# Patient Record
Sex: FEMALE | Race: WHITE | NOT HISPANIC OR LATINO | Employment: OTHER | URBAN - METROPOLITAN AREA
[De-identification: names, ages, dates, MRNs, and addresses within clinical notes are randomized per-mention and may not be internally consistent; named-entity substitution may affect disease eponyms.]

---

## 2023-05-05 LAB — CANCER AG 125 (U/ML) IN SERUM: 11.4 U/ML (ref 0–30.2)

## 2023-12-01 ENCOUNTER — OFFICE VISIT (OUTPATIENT)
Dept: PRIMARY CARE | Facility: CLINIC | Age: 68
End: 2023-12-01
Payer: MEDICARE

## 2023-12-01 VITALS
DIASTOLIC BLOOD PRESSURE: 80 MMHG | BODY MASS INDEX: 28.25 KG/M2 | WEIGHT: 180 LBS | SYSTOLIC BLOOD PRESSURE: 130 MMHG | RESPIRATION RATE: 16 BRPM | HEIGHT: 67 IN | HEART RATE: 72 BPM

## 2023-12-01 DIAGNOSIS — F17.210 CIGARETTE NICOTINE DEPENDENCE WITHOUT COMPLICATION: ICD-10-CM

## 2023-12-01 DIAGNOSIS — M62.81 MUSCLE WEAKNESS (GENERALIZED): ICD-10-CM

## 2023-12-01 DIAGNOSIS — Z23 FLU VACCINE NEED: ICD-10-CM

## 2023-12-01 DIAGNOSIS — M50.90 CERVICAL DISC DISEASE: Primary | ICD-10-CM

## 2023-12-01 DIAGNOSIS — G47.33 OBSTRUCTIVE SLEEP APNEA: ICD-10-CM

## 2023-12-01 DIAGNOSIS — K21.9 GASTROESOPHAGEAL REFLUX DISEASE WITHOUT ESOPHAGITIS: ICD-10-CM

## 2023-12-01 DIAGNOSIS — K42.9 UMBILICAL HERNIA WITHOUT OBSTRUCTION AND WITHOUT GANGRENE: ICD-10-CM

## 2023-12-01 DIAGNOSIS — K80.20 ASYMPTOMATIC CHOLELITHIASIS: ICD-10-CM

## 2023-12-01 DIAGNOSIS — M51.16 LUMBAR DISC DISEASE WITH RADICULOPATHY: ICD-10-CM

## 2023-12-01 PROBLEM — M54.16 LUMBAR RADICULOPATHY: Status: ACTIVE | Noted: 2023-12-01

## 2023-12-01 PROBLEM — M25.562 LEFT KNEE PAIN: Status: ACTIVE | Noted: 2023-12-01

## 2023-12-01 PROBLEM — R22.1 NECK MASS: Status: ACTIVE | Noted: 2023-12-01

## 2023-12-01 PROBLEM — R05.3 CHRONIC COUGH: Status: ACTIVE | Noted: 2023-12-01

## 2023-12-01 PROBLEM — M54.12 CERVICAL RADICULOPATHY: Status: ACTIVE | Noted: 2023-12-01

## 2023-12-01 PROBLEM — K76.89 BENIGN LIVER CYST: Status: ACTIVE | Noted: 2023-12-01

## 2023-12-01 PROBLEM — J20.8 ACUTE BRONCHITIS DUE TO OTHER SPECIFIED ORGANISMS: Status: ACTIVE | Noted: 2023-12-01

## 2023-12-01 PROBLEM — B27.90 EBV INFECTION: Status: ACTIVE | Noted: 2023-12-01

## 2023-12-01 PROBLEM — R59.9 ADENOPATHY: Status: ACTIVE | Noted: 2023-12-01

## 2023-12-01 PROBLEM — M54.16 LUMBAR BACK PAIN WITH RADICULOPATHY AFFECTING RIGHT LOWER EXTREMITY: Status: ACTIVE | Noted: 2023-12-01

## 2023-12-01 PROBLEM — R06.09 DOE (DYSPNEA ON EXERTION): Status: ACTIVE | Noted: 2023-12-01

## 2023-12-01 PROBLEM — J01.00 ACUTE NON-RECURRENT MAXILLARY SINUSITIS: Status: ACTIVE | Noted: 2023-12-01

## 2023-12-01 PROBLEM — M17.11 RIGHT KNEE DJD: Status: ACTIVE | Noted: 2023-12-01

## 2023-12-01 PROBLEM — M25.572 ACUTE LEFT ANKLE PAIN: Status: ACTIVE | Noted: 2023-12-01

## 2023-12-01 PROBLEM — R91.8 LUNG NODULES: Status: ACTIVE | Noted: 2023-12-01

## 2023-12-01 PROBLEM — S93.432A SPRAIN OF TIBIOFIBULAR LIGAMENT OF LEFT ANKLE: Status: ACTIVE | Noted: 2023-12-01

## 2023-12-01 PROBLEM — R60.0 EDEMA OF LEFT FOOT: Status: ACTIVE | Noted: 2023-12-01

## 2023-12-01 PROBLEM — M25.461 EFFUSION OF RIGHT KNEE: Status: ACTIVE | Noted: 2023-12-01

## 2023-12-01 PROBLEM — J45.991 COUGH VARIANT ASTHMA (HHS-HCC): Status: ACTIVE | Noted: 2023-12-01

## 2023-12-01 PROBLEM — R52 PAIN: Status: ACTIVE | Noted: 2023-12-01

## 2023-12-01 PROBLEM — M25.472 LEFT ANKLE SWELLING: Status: ACTIVE | Noted: 2023-12-01

## 2023-12-01 PROBLEM — I70.90 ATHEROSCLEROSIS: Status: ACTIVE | Noted: 2023-12-01

## 2023-12-01 PROBLEM — M51.369 LUMBAR DEGENERATIVE DISC DISEASE: Status: ACTIVE | Noted: 2023-12-01

## 2023-12-01 PROBLEM — F17.200 NICOTINE DEPENDENCE: Status: ACTIVE | Noted: 2023-12-01

## 2023-12-01 PROBLEM — M51.36 LUMBAR DEGENERATIVE DISC DISEASE: Status: ACTIVE | Noted: 2023-12-01

## 2023-12-01 PROBLEM — M17.11 ARTHRITIS OF RIGHT KNEE: Status: ACTIVE | Noted: 2023-12-01

## 2023-12-01 PROBLEM — B27.00 EBV (EPSTEIN-BARR VIRUS) VIREMIA: Status: ACTIVE | Noted: 2023-12-01

## 2023-12-01 PROBLEM — R59.0 CERVICAL ADENOPATHY: Status: ACTIVE | Noted: 2023-12-01

## 2023-12-01 PROCEDURE — G0008 ADMIN INFLUENZA VIRUS VAC: HCPCS | Performed by: INTERNAL MEDICINE

## 2023-12-01 PROCEDURE — 99214 OFFICE O/P EST MOD 30 MIN: CPT | Performed by: INTERNAL MEDICINE

## 2023-12-01 PROCEDURE — 90662 IIV NO PRSV INCREASED AG IM: CPT | Performed by: INTERNAL MEDICINE

## 2023-12-01 PROCEDURE — 1126F AMNT PAIN NOTED NONE PRSNT: CPT | Performed by: INTERNAL MEDICINE

## 2023-12-01 PROCEDURE — 4004F PT TOBACCO SCREEN RCVD TLK: CPT | Performed by: INTERNAL MEDICINE

## 2023-12-01 ASSESSMENT — ENCOUNTER SYMPTOMS
ENDOCRINE NEGATIVE: 1
GASTROINTESTINAL NEGATIVE: 1
NEUROLOGICAL NEGATIVE: 1
HEMATOLOGIC/LYMPHATIC NEGATIVE: 1
ALLERGIC/IMMUNOLOGIC NEGATIVE: 1
RESPIRATORY NEGATIVE: 1
MUSCULOSKELETAL NEGATIVE: 1
PSYCHIATRIC NEGATIVE: 1
CONSTITUTIONAL NEGATIVE: 1
CARDIOVASCULAR NEGATIVE: 1
EYES NEGATIVE: 1

## 2023-12-01 NOTE — ASSESSMENT & PLAN NOTE
Asymptomatic  now and we will follow closely and consider and advise cholecystectomy and educate risks of gall bladder disease and calculus

## 2023-12-01 NOTE — PROGRESS NOTES
"Subjective   Patient ID: Hanna Gee is a 68 y.o. female who presents for Follow-up (Follow up).    HPI     Review of Systems   Constitutional: Negative.    HENT: Negative.     Eyes: Negative.    Respiratory: Negative.     Cardiovascular: Negative.    Gastrointestinal: Negative.    Endocrine: Negative.    Musculoskeletal: Negative.    Skin: Negative.    Allergic/Immunologic: Negative.    Neurological: Negative.    Hematological: Negative.    Psychiatric/Behavioral: Negative.     All other systems reviewed and are negative.      Objective   Ht 1.702 m (5' 7\")   Wt 81.6 kg (180 lb)   BMI 28.19 kg/m²   Blood pressure 130/80, pulse 72, resp. rate 16, height 1.702 m (5' 7\"), weight 81.6 kg (180 lb).   Physical Exam  Vitals and nursing note reviewed.   Constitutional:       Appearance: Normal appearance.   HENT:      Head: Normocephalic and atraumatic.      Right Ear: Tympanic membrane, ear canal and external ear normal.      Left Ear: Tympanic membrane, ear canal and external ear normal. There is no impacted cerumen.      Nose: Nose normal.      Mouth/Throat:      Mouth: Mucous membranes are moist.      Pharynx: Oropharynx is clear.   Eyes:      Extraocular Movements: Extraocular movements intact.      Conjunctiva/sclera: Conjunctivae normal.      Pupils: Pupils are equal, round, and reactive to light.   Cardiovascular:      Rate and Rhythm: Normal rate and regular rhythm.      Pulses: Normal pulses.      Heart sounds: Normal heart sounds. No murmur heard.  Pulmonary:      Effort: Pulmonary effort is normal. No respiratory distress.      Breath sounds: Normal breath sounds. No stridor. No wheezing, rhonchi or rales.   Chest:      Chest wall: No tenderness.   Abdominal:      General: Abdomen is flat. Bowel sounds are normal. There is no distension.      Palpations: Abdomen is soft. There is no mass.      Tenderness: There is no abdominal tenderness. There is no right CVA tenderness, left CVA tenderness, guarding or " rebound.      Hernia: No hernia is present.   Musculoskeletal:         General: Normal range of motion.      Cervical back: Normal range of motion and neck supple.   Skin:     General: Skin is warm.      Capillary Refill: Capillary refill takes less than 2 seconds.   Neurological:      General: No focal deficit present.      Mental Status: She is alert.      Cranial Nerves: No cranial nerve deficit.      Sensory: No sensory deficit.      Motor: No weakness.      Coordination: Coordination normal.      Gait: Gait normal.      Deep Tendon Reflexes: Reflexes normal.   Psychiatric:         Mood and Affect: Mood normal.         Behavior: Behavior normal. Behavior is cooperative.         Thought Content: Thought content normal.         Judgment: Judgment normal.         Assessment/Plan   Problem List Items Addressed This Visit             ICD-10-CM    Acid reflux K21.9     GERD - Acid reflux disease. Rx. PPI (Prilosec/Prevacid/Protonix/Nexium) and educate diet and life style changes. Referred patient to an endoscopy (EGD) and check H. Pylori titers.          Asymptomatic cholelithiasis K80.20     Asymptomatic  now and we will follow closely and consider and advise cholecystectomy and educate risks of gall bladder disease and calculus          Cervical disc disease - Primary M50.90     DDD - Degenerative disc disease of the )/Cervical (C)  spine. Educate exercises and referred patient to Physical Therapy (PT). Ordered X-Ray's of the /C spine. Consider MRI. Radiculopathy in the distribution of C4-C5-C6 nerve roots, needs NSAIDS (Naprosin 500mg BID vs. Arthrotec 75mg BID or Prednisone taper (Medrol dose pack), Flexeril 10 mg qHS, heat application, and pain control with Tylenol 325 mg TID.           Lumbar disc disease with radiculopathy M51.16     DDD - Degenerative disc disease of the Lumbo-Sacral (LS)/Cervical (C)  spine. Educate exercises and referred patient to Physical Therapy (PT). Ordered X-Ray's of the LS/C spine.  Consider MRI. Radiculopathy in the distribution of L4-L5-S1/C3-C4-C5 nerve roots, needs NSAIDS (Naprosin 500mg BID vs. Arthrotec 75mg BID or Prednisone taper (Medrol dose pack), Flexeril 10 mg qHS, heat application, and pain control with Tylenol 325 mg TID.            Muscle weakness (generalized) M62.81     Kne pains eand .reccomend exercises and strengthening exercises and .Xrays and refer to physical therapy and pain control           Nicotine dependence F17.200     Tobacco cessation - Educate risks of smoking (CAD/COPD/CANCER of the lung or urinary bladder/CVA). Educate life style changes and prescribe nicotine patch and Wellbutrin 150mg BID. Educate stress reduction.                                                                                      Obstructive sleep apnea G47.33     Sleep apnea/nocturnal hypoxia - Not/ using the CPAP consider repeat sleep studies and order a new CPAP - The patient is complaining of day-time sleepiness(falling asleep easily/  narcolepsy) while driving or sitting still. Also patient complains of major tiredness/ fatigue, multiple episodes of night time awakenings(unexpalined). Also patient is at high risk for sleep apnea: overweight, small throat opening and enlarged (kissing) tonsils, sedentary lifestyle, sleeping aides. Recommend: Sleep studies: Nocturnal oxymetry, sleep lab. Consider CPAP vs. Oxygen per Nasal Canula at night at 2L/min. for nocturnal hypoxia          Umbilical hernia without obstruction and without gangrene K42.9     Asymptomatic  now  - educate to avoid strain and recommend repair           Other Visit Diagnoses         Codes    Flu vaccine need     Z23    Relevant Orders    Flu vaccine, quadrivalent, high-dose, preservative free, age 65y+ (FLUZONE)

## 2023-12-01 NOTE — ASSESSMENT & PLAN NOTE
Kne pains eand .reccomend exercises and strengthening exercises and .Xrays and refer to physical therapy and pain control

## 2023-12-01 NOTE — ASSESSMENT & PLAN NOTE
DDD - Degenerative disc disease of the Lumbo-Sacral (LS)/Cervical (C)  spine. Educate exercises and referred patient to Physical Therapy (PT). Ordered X-Ray's of the LS/C spine. Consider MRI. Radiculopathy in the distribution of L4-L5-S1/C3-C4-C5 nerve roots, needs NSAIDS (Naprosin 500mg BID vs. Arthrotec 75mg BID or Prednisone taper (Medrol dose pack), Flexeril 10 mg qHS, heat application, and pain control with Tylenol 325 mg TID.

## 2023-12-01 NOTE — ASSESSMENT & PLAN NOTE
Sleep apnea/nocturnal hypoxia - Not/ using the CPAP consider repeat sleep studies and order a new CPAP - The patient is complaining of day-time sleepiness(falling asleep easily/  narcolepsy) while driving or sitting still. Also patient complains of major tiredness/ fatigue, multiple episodes of night time awakenings(unexpalined). Also patient is at high risk for sleep apnea: overweight, small throat opening and enlarged (kissing) tonsils, sedentary lifestyle, sleeping aides. Recommend: Sleep studies: Nocturnal oxymetry, sleep lab. Consider CPAP vs. Oxygen per Nasal Canula at night at 2L/min. for nocturnal hypoxia

## 2024-02-23 ENCOUNTER — OFFICE VISIT (OUTPATIENT)
Dept: PRIMARY CARE | Facility: CLINIC | Age: 69
End: 2024-02-23
Payer: MEDICARE

## 2024-02-23 ENCOUNTER — LAB (OUTPATIENT)
Dept: LAB | Facility: LAB | Age: 69
End: 2024-02-23
Payer: MEDICARE

## 2024-02-23 VITALS
SYSTOLIC BLOOD PRESSURE: 130 MMHG | BODY MASS INDEX: 28.56 KG/M2 | WEIGHT: 182 LBS | HEART RATE: 72 BPM | HEIGHT: 67 IN | RESPIRATION RATE: 16 BRPM | DIASTOLIC BLOOD PRESSURE: 80 MMHG

## 2024-02-23 DIAGNOSIS — Z13.6 SCREENING FOR CARDIOVASCULAR CONDITION: ICD-10-CM

## 2024-02-23 DIAGNOSIS — E78.00 HYPERCHOLESTEROLEMIA: ICD-10-CM

## 2024-02-23 DIAGNOSIS — R53.83 OTHER FATIGUE: ICD-10-CM

## 2024-02-23 DIAGNOSIS — Z00.00 MEDICARE ANNUAL WELLNESS VISIT, SUBSEQUENT: Primary | ICD-10-CM

## 2024-02-23 DIAGNOSIS — M25.562 ACUTE PAIN OF LEFT KNEE: ICD-10-CM

## 2024-02-23 DIAGNOSIS — Z00.00 ROUTINE GENERAL MEDICAL EXAMINATION AT HEALTH CARE FACILITY: ICD-10-CM

## 2024-02-23 DIAGNOSIS — K21.9 GASTROESOPHAGEAL REFLUX DISEASE WITHOUT ESOPHAGITIS: ICD-10-CM

## 2024-02-23 DIAGNOSIS — Z12.31 SCREENING MAMMOGRAM FOR BREAST CANCER: ICD-10-CM

## 2024-02-23 DIAGNOSIS — M51.16 LUMBAR DISC DISEASE WITH RADICULOPATHY: ICD-10-CM

## 2024-02-23 LAB
ALBUMIN SERPL BCP-MCNC: 4.5 G/DL (ref 3.4–5)
ALP SERPL-CCNC: 55 U/L (ref 33–136)
ALT SERPL W P-5'-P-CCNC: 16 U/L (ref 7–45)
ANION GAP SERPL CALC-SCNC: 12 MMOL/L (ref 10–20)
AST SERPL W P-5'-P-CCNC: 14 U/L (ref 9–39)
BASOPHILS # BLD AUTO: 0.04 X10*3/UL (ref 0–0.1)
BASOPHILS NFR BLD AUTO: 0.6 %
BILIRUB SERPL-MCNC: 0.7 MG/DL (ref 0–1.2)
BUN SERPL-MCNC: 17 MG/DL (ref 6–23)
CALCIUM SERPL-MCNC: 9.7 MG/DL (ref 8.6–10.6)
CHLORIDE SERPL-SCNC: 105 MMOL/L (ref 98–107)
CHOLEST SERPL-MCNC: 195 MG/DL (ref 0–199)
CHOLESTEROL/HDL RATIO: 2.3
CO2 SERPL-SCNC: 28 MMOL/L (ref 21–32)
CREAT SERPL-MCNC: 0.87 MG/DL (ref 0.5–1.05)
EGFRCR SERPLBLD CKD-EPI 2021: 74 ML/MIN/1.73M*2
EOSINOPHIL # BLD AUTO: 0.04 X10*3/UL (ref 0–0.7)
EOSINOPHIL NFR BLD AUTO: 0.6 %
ERYTHROCYTE [DISTWIDTH] IN BLOOD BY AUTOMATED COUNT: 14.5 % (ref 11.5–14.5)
GLUCOSE SERPL-MCNC: 94 MG/DL (ref 74–99)
HCT VFR BLD AUTO: 40.7 % (ref 36–46)
HDLC SERPL-MCNC: 83.7 MG/DL
HGB BLD-MCNC: 14 G/DL (ref 12–16)
IMM GRANULOCYTES # BLD AUTO: 0.01 X10*3/UL (ref 0–0.7)
IMM GRANULOCYTES NFR BLD AUTO: 0.1 % (ref 0–0.9)
LDLC SERPL CALC-MCNC: 95 MG/DL
LYMPHOCYTES # BLD AUTO: 1.71 X10*3/UL (ref 1.2–4.8)
LYMPHOCYTES NFR BLD AUTO: 25.3 %
MCH RBC QN AUTO: 30 PG (ref 26–34)
MCHC RBC AUTO-ENTMCNC: 34.4 G/DL (ref 32–36)
MCV RBC AUTO: 87 FL (ref 80–100)
MONOCYTES # BLD AUTO: 0.42 X10*3/UL (ref 0.1–1)
MONOCYTES NFR BLD AUTO: 6.2 %
NEUTROPHILS # BLD AUTO: 4.55 X10*3/UL (ref 1.2–7.7)
NEUTROPHILS NFR BLD AUTO: 67.2 %
NON HDL CHOLESTEROL: 111 MG/DL (ref 0–149)
NRBC BLD-RTO: 0 /100 WBCS (ref 0–0)
PLATELET # BLD AUTO: 262 X10*3/UL (ref 150–450)
POTASSIUM SERPL-SCNC: 4.4 MMOL/L (ref 3.5–5.3)
PROT SERPL-MCNC: 6.9 G/DL (ref 6.4–8.2)
RBC # BLD AUTO: 4.66 X10*6/UL (ref 4–5.2)
SODIUM SERPL-SCNC: 141 MMOL/L (ref 136–145)
TRIGL SERPL-MCNC: 83 MG/DL (ref 0–149)
TSH SERPL-ACNC: 1.59 MIU/L (ref 0.44–3.98)
VLDL: 17 MG/DL (ref 0–40)
WBC # BLD AUTO: 6.8 X10*3/UL (ref 4.4–11.3)

## 2024-02-23 PROCEDURE — 93000 ELECTROCARDIOGRAM COMPLETE: CPT | Performed by: INTERNAL MEDICINE

## 2024-02-23 PROCEDURE — 84443 ASSAY THYROID STIM HORMONE: CPT

## 2024-02-23 PROCEDURE — 80053 COMPREHEN METABOLIC PANEL: CPT

## 2024-02-23 PROCEDURE — 99214 OFFICE O/P EST MOD 30 MIN: CPT | Performed by: INTERNAL MEDICINE

## 2024-02-23 PROCEDURE — 85025 COMPLETE CBC W/AUTO DIFF WBC: CPT

## 2024-02-23 PROCEDURE — 1170F FXNL STATUS ASSESSED: CPT | Performed by: INTERNAL MEDICINE

## 2024-02-23 PROCEDURE — G0439 PPPS, SUBSEQ VISIT: HCPCS | Performed by: INTERNAL MEDICINE

## 2024-02-23 PROCEDURE — 1159F MED LIST DOCD IN RCRD: CPT | Performed by: INTERNAL MEDICINE

## 2024-02-23 PROCEDURE — 80061 LIPID PANEL: CPT

## 2024-02-23 PROCEDURE — 1126F AMNT PAIN NOTED NONE PRSNT: CPT | Performed by: INTERNAL MEDICINE

## 2024-02-23 PROCEDURE — 36415 COLL VENOUS BLD VENIPUNCTURE: CPT

## 2024-02-23 PROCEDURE — 1160F RVW MEDS BY RX/DR IN RCRD: CPT | Performed by: INTERNAL MEDICINE

## 2024-02-23 ASSESSMENT — ACTIVITIES OF DAILY LIVING (ADL)
BATHING: INDEPENDENT
GROCERY_SHOPPING: INDEPENDENT
TAKING_MEDICATION: INDEPENDENT
DOING_HOUSEWORK: INDEPENDENT
DRESSING: INDEPENDENT
MANAGING_FINANCES: INDEPENDENT

## 2024-02-23 ASSESSMENT — ENCOUNTER SYMPTOMS
GASTROINTESTINAL NEGATIVE: 1
ENDOCRINE NEGATIVE: 1
ALLERGIC/IMMUNOLOGIC NEGATIVE: 1
CARDIOVASCULAR NEGATIVE: 1
RESPIRATORY NEGATIVE: 1
NEUROLOGICAL NEGATIVE: 1
EYES NEGATIVE: 1
HEMATOLOGIC/LYMPHATIC NEGATIVE: 1
ARTHRALGIAS: 1
CONSTITUTIONAL NEGATIVE: 1
PSYCHIATRIC NEGATIVE: 1

## 2024-02-23 ASSESSMENT — PATIENT HEALTH QUESTIONNAIRE - PHQ9
1. LITTLE INTEREST OR PLEASURE IN DOING THINGS: NOT AT ALL
SUM OF ALL RESPONSES TO PHQ9 QUESTIONS 1 AND 2: 0
2. FEELING DOWN, DEPRESSED OR HOPELESS: NOT AT ALL

## 2024-02-23 NOTE — ASSESSMENT & PLAN NOTE
DJD - Degenerative Joint Disease, Chronic Arthritis, of the Left more than the right  Knee. . Pain control, and anti-inflammatory meds : consider Kenalog injection and start Voltaren gel 1% topically BID,  and  Tylenol 325 mg TID PRN. Educate exercises and referred the patient to PT (Physical Therapy). Get X-Ray's.

## 2024-02-23 NOTE — ASSESSMENT & PLAN NOTE
No recent hospitalizations.    All medications reviewed and reconciled by me the provider..  No use of controlled substances or opiates.    Family history, social history reviewed, no changes.    Patient does not smoke.    Patient does not drink.    Patient hydrates adequately daily.  Eats a well-balanced healthy diet.     Exercises adequately including walking and doing weightbearing exercises.    Patient denies any difficulty with memory or cognition.     Denies any difficulty with hearing.  Patient does not wear hearing aids.    No fall risk.  No recent falls.  Denies any difficulty walking.    Patient with no history of depression anxiety, denies any loss of interest, no feeling of sadness, no lack of motivation.    Patient is independent in all ADLs and IADLs.  Independent bathing, dressing, walking.  Takes care of own finances, shopping and cooking.     End-of-life decision-making power of  reviewed with patient.     Risk Factors Identified During Visit: None.   Influenza: influenza vaccine was previously given.   Pneumovax 23: Pneumovax 23 vaccine was previously given.   Prevnar 13: Prevnar 13 vaccine was previously given.   Shingles Vaccine: Shingles vaccine was previously given.   Colorectal Cancer Screening: screening is current.   Abdominal Aortic Aneurysm screening: screening is current.   HIV screening: screening not indicated.      Preventive measures - Recommend ASAP : PAP/Mamogram and refer patient to GYN. Specialist. Colonoscopy (educate patient risks of colon cancer) refer patient to GI specialist. Ophthalmology and retina exam recommend yearly exams refer patient to an Ophthalmologist.

## 2024-02-23 NOTE — PROGRESS NOTES
"Subjective   Patient ID: Hanna Gee is a 68 y.o. female who presents for Welcome To Medicare (Welcome to medicare/Follow up). Left knee pain     HPI     Review of Systems   Constitutional: Negative.    HENT: Negative.     Eyes: Negative.    Respiratory: Negative.     Cardiovascular: Negative.    Gastrointestinal: Negative.    Endocrine: Negative.    Musculoskeletal:  Positive for arthralgias.   Skin: Negative.    Allergic/Immunologic: Negative.    Neurological: Negative.    Hematological: Negative.    Psychiatric/Behavioral: Negative.     All other systems reviewed and are negative.      Objective   Ht 1.702 m (5' 7\")   Wt 82.6 kg (182 lb)   BMI 28.51 kg/m²   Blood pressure 130/80, pulse 72, resp. rate 16, height 1.702 m (5' 7\"), weight 82.6 kg (182 lb).   Physical Exam  Vitals and nursing note reviewed.   Constitutional:       Appearance: Normal appearance.   HENT:      Head: Normocephalic and atraumatic.      Right Ear: Tympanic membrane, ear canal and external ear normal.      Left Ear: Tympanic membrane, ear canal and external ear normal. There is no impacted cerumen.      Nose: Nose normal.      Mouth/Throat:      Mouth: Mucous membranes are moist.      Pharynx: Oropharynx is clear.   Eyes:      Extraocular Movements: Extraocular movements intact.      Conjunctiva/sclera: Conjunctivae normal.      Pupils: Pupils are equal, round, and reactive to light.   Cardiovascular:      Rate and Rhythm: Normal rate and regular rhythm.      Pulses: Normal pulses.      Heart sounds: Normal heart sounds. No murmur heard.  Pulmonary:      Effort: Pulmonary effort is normal. No respiratory distress.      Breath sounds: Normal breath sounds. No stridor. No wheezing, rhonchi or rales.   Chest:      Chest wall: No tenderness.   Abdominal:      General: Abdomen is flat. Bowel sounds are normal. There is no distension.      Palpations: Abdomen is soft. There is no mass.      Tenderness: There is no abdominal tenderness. There is " no right CVA tenderness, left CVA tenderness, guarding or rebound.      Hernia: No hernia is present.   Musculoskeletal:         General: Normal range of motion.      Cervical back: Normal range of motion and neck supple.   Skin:     General: Skin is warm.      Capillary Refill: Capillary refill takes less than 2 seconds.   Neurological:      General: No focal deficit present.      Mental Status: She is alert.      Cranial Nerves: No cranial nerve deficit.      Sensory: No sensory deficit.      Motor: No weakness.      Coordination: Coordination normal.      Gait: Gait normal.      Deep Tendon Reflexes: Reflexes normal.   Psychiatric:         Mood and Affect: Mood normal.         Behavior: Behavior normal. Behavior is cooperative.         Thought Content: Thought content normal.         Judgment: Judgment normal.         Assessment/Plan   Problem List Items Addressed This Visit             ICD-10-CM    Acid reflux K21.9     GERD - Acid reflux disease. Rx. PPI (Prilosec/Prevacid/Protonix/Nexium) and educate diet and life style changes. Referred patient to an endoscopy (EGD) and check H. Pylori titers.          Lumbar disc disease with radiculopathy M51.16     DDD - Degenerative disc disease of the Lumbo-Sacral (LS)/Cervical (C) spine. Educate exercises and referred patient to Physical Therapy (PT). Ordered X-Ray's of the LS/C spine. Consider MRI. Radiculopathy in the distribution of L4-L5-S1/C3-C4-C5 nerve roots, needs NSAIDS (Naprosin 500mg BID vs. Arthrotec 75mg BID or Prednisone taper (Medrol dose pack), Flexeril 10 mg qHS, heat application, and pain control with Tylenol 325 mg TID.          Left knee pain M25.562     DJD - Degenerative Joint Disease, Chronic Arthritis, of the Left more than the right  Knee. . Pain control, and anti-inflammatory meds : consider Kenalog injection and start Voltaren gel 1% topically BID,  and  Tylenol 325 mg TID PRN. Educate exercises and referred the patient to PT (Physical  Therapy). Get X-Ray's.             Relevant Orders    XR knee left 1-2 views    Medicare annual wellness visit, subsequent - Primary Z00.00     No recent hospitalizations.    All medications reviewed and reconciled by me the provider..  No use of controlled substances or opiates.    Family history, social history reviewed, no changes.    Patient does not smoke.    Patient does not drink.    Patient hydrates adequately daily.  Eats a well-balanced healthy diet.     Exercises adequately including walking and doing weightbearing exercises.    Patient denies any difficulty with memory or cognition.     Denies any difficulty with hearing.  Patient does not wear hearing aids.    No fall risk.  No recent falls.  Denies any difficulty walking.    Patient with no history of depression anxiety, denies any loss of interest, no feeling of sadness, no lack of motivation.    Patient is independent in all ADLs and IADLs.  Independent bathing, dressing, walking.  Takes care of own finances, shopping and cooking.     End-of-life decision-making power of  reviewed with patient.     Risk Factors Identified During Visit: None.   Influenza: influenza vaccine was previously given.   Pneumovax 23: Pneumovax 23 vaccine was previously given.   Prevnar 13: Prevnar 13 vaccine was previously given.   Shingles Vaccine: Shingles vaccine was previously given.   Colorectal Cancer Screening: screening is current.   Abdominal Aortic Aneurysm screening: screening is current.   HIV screening: screening not indicated.      Preventive measures - Recommend ASAP : PAP/Mamogram and refer patient to GYN. Specialist. Colonoscopy (educate patient risks of colon cancer) refer patient to GI specialist. Ophthalmology and retina exam recommend yearly exams refer patient to an Ophthalmologist.           Other Visit Diagnoses         Codes    Screening mammogram for breast cancer     Z12.31    Relevant Orders    BI mammo bilateral screening tomosynthesis             Acid reflux K21.9        GERD - Acid reflux disease. Rx. PPI (Prilosec/Prevacid/Protonix/Nexium) and educate diet and life style changes. Referred patient to an endoscopy (EGD) and check H. Pylori titers.            Asymptomatic cholelithiasis K80.20       Asymptomatic  now and we will follow closely and consider and advise cholecystectomy and educate risks of gall bladder disease and calculus            Cervical disc disease - Primary M50.90       DDD - Degenerative disc disease of the )/Cervical (C)  spine. Educate exercises and referred patient to Physical Therapy (PT). Ordered X-Ray's of the /C spine. Consider MRI. Radiculopathy in the distribution of C4-C5-C6 nerve roots, needs NSAIDS (Naprosin 500mg BID vs. Arthrotec 75mg BID or Prednisone taper (Medrol dose pack), Flexeril 10 mg qHS, heat application, and pain control with Tylenol 325 mg TID.             Lumbar disc disease with radiculopathy M51.16       DDD - Degenerative disc disease of the Lumbo-Sacral (LS)/Cervical (C)  spine. Educate exercises and referred patient to Physical Therapy (PT). Ordered X-Ray's of the LS/C spine. Consider MRI. Radiculopathy in the distribution of L4-L5-S1/C3-C4-C5 nerve roots, needs NSAIDS (Naprosin 500mg BID vs. Arthrotec 75mg BID or Prednisone taper (Medrol dose pack), Flexeril 10 mg qHS, heat application, and pain control with Tylenol 325 mg TID.               Muscle weakness (generalized) M62.81       Kne pains eand .reccomend exercises and strengthening exercises and .Xrays and refer to physical therapy and pain control             Nicotine dependence F17.200       Tobacco cessation - Educate risks of smoking (CAD/COPD/CANCER of the lung or urinary bladder/CVA). Educate life style changes and prescribe nicotine patch and Wellbutrin 150mg BID. Educate stress reduction.                                                                                        Obstructive sleep apnea G47.33       Sleep  apnea/nocturnal hypoxia - Not/ using the CPAP consider repeat sleep studies and order a new CPAP - The patient is complaining of day-time sleepiness(falling asleep easily/  narcolepsy) while driving or sitting still. Also patient complains of major tiredness/ fatigue, multiple episodes of night time awakenings(unexpalined). Also patient is at high risk for sleep apnea: overweight, small throat opening and enlarged (kissing) tonsils, sedentary lifestyle, sleeping aides. Recommend: Sleep studies: Nocturnal oxymetry, sleep lab. Consider CPAP vs. Oxygen per Nasal Canula at night at 2L/min. for nocturnal hypoxia            Umbilical hernia without obstruction and without gangrene K42.9       Asymptomatic  now  - educate to avoid strain and recommend repair             Other Visit Diagnoses           Codes     Flu vaccine need     Z23     Relevant Orders     Flu vaccine, quadrivalent, high-dose, preservative free, age 65y+ (FLUZONE)                            Immunizations/Injections      very important  Immunizations from outside sources need reconciliation.      Flu vaccine, quadrivalent, high-dose, preservative free, age 65y+ (FLUZONE)12/1/2023  Pfizer COVID-19 vaccine, Fall 2023, 12 years and older, (30mcg/0.3mL)11/10/2023  Pfizer COVID-19 vaccine, bivalent, age 12 years and older (30 mcg/0.3 mL)10/26/2022  Pfizer Purple Cap SARS-CoV-21/2/2022, 4/4/2021, 3/13/2021

## 2024-04-04 ENCOUNTER — OFFICE VISIT (OUTPATIENT)
Dept: PRIMARY CARE | Facility: CLINIC | Age: 69
End: 2024-04-04
Payer: MEDICARE

## 2024-04-04 VITALS
WEIGHT: 182 LBS | BODY MASS INDEX: 28.56 KG/M2 | HEIGHT: 67 IN | DIASTOLIC BLOOD PRESSURE: 65 MMHG | RESPIRATION RATE: 16 BRPM | HEART RATE: 72 BPM | SYSTOLIC BLOOD PRESSURE: 115 MMHG

## 2024-04-04 DIAGNOSIS — M50.90 CERVICAL DISC DISEASE: ICD-10-CM

## 2024-04-04 DIAGNOSIS — G47.33 OBSTRUCTIVE SLEEP APNEA: ICD-10-CM

## 2024-04-04 DIAGNOSIS — H93.11 TINNITUS OF RIGHT EAR: Primary | ICD-10-CM

## 2024-04-04 DIAGNOSIS — H90.3 SENSORINEURAL HEARING LOSS (SNHL) OF BOTH EARS: ICD-10-CM

## 2024-04-04 DIAGNOSIS — K21.9 GASTROESOPHAGEAL REFLUX DISEASE WITHOUT ESOPHAGITIS: ICD-10-CM

## 2024-04-04 DIAGNOSIS — M51.16 LUMBAR DISC DISEASE WITH RADICULOPATHY: ICD-10-CM

## 2024-04-04 PROBLEM — K57.32 DIVERTICULITIS OF COLON: Status: RESOLVED | Noted: 2024-04-04 | Resolved: 2024-04-04

## 2024-04-04 PROBLEM — S93.439A SPRAIN OF TIBIOFIBULAR LIGAMENT OF ANKLE: Status: RESOLVED | Noted: 2023-12-01 | Resolved: 2024-04-04

## 2024-04-04 PROBLEM — N83.00 FOLLICULAR CYST OF OVARY: Status: RESOLVED | Noted: 2024-04-04 | Resolved: 2024-04-04

## 2024-04-04 PROBLEM — H16.9 KERATITIS: Status: RESOLVED | Noted: 2024-04-04 | Resolved: 2024-04-04

## 2024-04-04 PROBLEM — J45.991 COUGH VARIANT ASTHMA (HHS-HCC): Status: RESOLVED | Noted: 2023-12-01 | Resolved: 2024-04-04

## 2024-04-04 PROBLEM — H61.20 IMPACTED CERUMEN: Status: RESOLVED | Noted: 2024-04-04 | Resolved: 2024-04-04

## 2024-04-04 PROBLEM — R10.31 RIGHT LOWER QUADRANT ABDOMINAL PAIN: Status: RESOLVED | Noted: 2024-04-04 | Resolved: 2024-04-04

## 2024-04-04 PROCEDURE — 1159F MED LIST DOCD IN RCRD: CPT | Performed by: INTERNAL MEDICINE

## 2024-04-04 PROCEDURE — 99214 OFFICE O/P EST MOD 30 MIN: CPT | Performed by: INTERNAL MEDICINE

## 2024-04-04 PROCEDURE — 1160F RVW MEDS BY RX/DR IN RCRD: CPT | Performed by: INTERNAL MEDICINE

## 2024-04-04 RX ORDER — SPIRONOLACTONE 25 MG/1
25 TABLET ORAL DAILY
Qty: 30 TABLET | Refills: 5 | Status: SHIPPED | OUTPATIENT
Start: 2024-04-04 | End: 2024-10-01

## 2024-04-04 RX ORDER — FLUTICASONE FUROATE 27.5 UG/1
1 SPRAY, METERED NASAL
Qty: 10 G | Refills: 5 | Status: SHIPPED | OUTPATIENT
Start: 2024-04-04 | End: 2024-05-24 | Stop reason: ALTCHOICE

## 2024-04-04 RX ORDER — MINERAL OIL
180 ENEMA (ML) RECTAL DAILY
Qty: 30 TABLET | Refills: 5 | Status: SHIPPED | OUTPATIENT
Start: 2024-04-04 | End: 2024-05-24 | Stop reason: ALTCHOICE

## 2024-04-04 ASSESSMENT — ENCOUNTER SYMPTOMS
HEMATOLOGIC/LYMPHATIC NEGATIVE: 1
CONSTITUTIONAL NEGATIVE: 1
GASTROINTESTINAL NEGATIVE: 1
ALLERGIC/IMMUNOLOGIC NEGATIVE: 1
RESPIRATORY NEGATIVE: 1
EYES NEGATIVE: 1
MUSCULOSKELETAL NEGATIVE: 1
CARDIOVASCULAR NEGATIVE: 1
NEUROLOGICAL NEGATIVE: 1
PSYCHIATRIC NEGATIVE: 1
ENDOCRINE NEGATIVE: 1

## 2024-04-04 NOTE — PROGRESS NOTES
"Subjective   Patient ID: Hanna Gee is a 68 y.o. female who presents for Tinnitus (Ringing in right ear for 2-3 weeks ).    HPI     Review of Systems   Constitutional: Negative.    HENT:  Positive for hearing loss and tinnitus.    Eyes: Negative.    Respiratory: Negative.     Cardiovascular: Negative.    Gastrointestinal: Negative.    Endocrine: Negative.    Musculoskeletal: Negative.    Skin: Negative.    Allergic/Immunologic: Negative.    Neurological: Negative.    Hematological: Negative.    Psychiatric/Behavioral: Negative.     All other systems reviewed and are negative.      Objective   Ht 1.702 m (5' 7\")   Wt 82.6 kg (182 lb)   BMI 28.51 kg/m²   Height 1.702 m (5' 7\"), weight 82.6 kg (182 lb).   Physical Exam  Vitals and nursing note reviewed.   Constitutional:       Appearance: Normal appearance.   HENT:      Head: Normocephalic and atraumatic.      Right Ear: Tympanic membrane, ear canal and external ear normal.      Left Ear: Tympanic membrane, ear canal and external ear normal. There is no impacted cerumen.      Nose: Nose normal.      Mouth/Throat:      Mouth: Mucous membranes are moist.      Pharynx: Oropharynx is clear.   Eyes:      Extraocular Movements: Extraocular movements intact.      Conjunctiva/sclera: Conjunctivae normal.      Pupils: Pupils are equal, round, and reactive to light.   Cardiovascular:      Rate and Rhythm: Normal rate and regular rhythm.      Pulses: Normal pulses.      Heart sounds: Normal heart sounds. No murmur heard.  Pulmonary:      Effort: Pulmonary effort is normal. No respiratory distress.      Breath sounds: Normal breath sounds. No stridor. No wheezing, rhonchi or rales.   Chest:      Chest wall: No tenderness.   Abdominal:      General: Abdomen is flat. Bowel sounds are normal. There is no distension.      Palpations: Abdomen is soft. There is no mass.      Tenderness: There is no abdominal tenderness. There is no right CVA tenderness, left CVA tenderness, guarding " or rebound.      Hernia: No hernia is present.   Musculoskeletal:         General: Normal range of motion.      Cervical back: Normal range of motion and neck supple.   Skin:     General: Skin is warm.      Capillary Refill: Capillary refill takes less than 2 seconds.   Neurological:      General: No focal deficit present.      Mental Status: She is alert.      Cranial Nerves: No cranial nerve deficit.      Sensory: No sensory deficit.      Motor: No weakness.      Coordination: Coordination normal.      Gait: Gait normal.      Deep Tendon Reflexes: Reflexes normal.   Psychiatric:         Mood and Affect: Mood normal.         Behavior: Behavior normal. Behavior is cooperative.         Thought Content: Thought content normal.         Judgment: Judgment normal.         Assessment/Plan   Problem List Items Addressed This Visit             ICD-10-CM    Acid reflux K21.9    Cervical disc disease M50.90    Lumbar disc disease with radiculopathy M51.16    Obstructive sleep apnea G47.33    Tinnitus of right ear - Primary H93.11     Refer to ENT and start low dose Spironolactone 25 mg daily and monitor and start Flonase I BID and Allegra 180 mg daily          Relevant Medications    fluticasone (Flonase Sensimist) 27.5 mcg/actuation nasal spray    spironolactone (Aldactone) 25 mg tablet    fexofenadine (Allegra) 180 mg tablet    Other Relevant Orders    Referral to ENT    Sensorineural hearing loss (SNHL) of both ears H90.3     Refer to ENT and audiology monitor cerumen impaction         Relevant Orders    Referral to ENT          Acid reflux K21.9         GERD - Acid reflux disease. Rx. PPI (Prilosec/Prevacid/Protonix/Nexium) and educate diet and life style changes. Referred patient to an endoscopy (EGD) and check H. Pylori titers.            Asymptomatic cholelithiasis K80.20       Asymptomatic  now and we will follow closely and consider and advise cholecystectomy and educate risks of gall bladder disease and calculus             Cervical disc disease - Primary M50.90       DDD - Degenerative disc disease of the )/Cervical (C)  spine. Educate exercises and referred patient to Physical Therapy (PT). Ordered X-Ray's of the /C spine. Consider MRI. Radiculopathy in the distribution of C4-C5-C6 nerve roots, needs NSAIDS (Naprosin 500mg BID vs. Arthrotec 75mg BID or Prednisone taper (Medrol dose pack), Flexeril 10 mg qHS, heat application, and pain control with Tylenol 325 mg TID.             Lumbar disc disease with radiculopathy M51.16       DDD - Degenerative disc disease of the Lumbo-Sacral (LS)/Cervical (C)  spine. Educate exercises and referred patient to Physical Therapy (PT). Ordered X-Ray's of the LS/C spine. Consider MRI. Radiculopathy in the distribution of L4-L5-S1/C3-C4-C5 nerve roots, needs NSAIDS (Naprosin 500mg BID vs. Arthrotec 75mg BID or Prednisone taper (Medrol dose pack), Flexeril 10 mg qHS, heat application, and pain control with Tylenol 325 mg TID.               Muscle weakness (generalized) M62.81       Kne pains eand .reccomend exercises and strengthening exercises and .Xrays and refer to physical therapy and pain control             Nicotine dependence F17.200       Tobacco cessation - Educate risks of smoking (CAD/COPD/CANCER of the lung or urinary bladder/CVA). Educate life style changes and prescribe nicotine patch and Wellbutrin 150mg BID. Educate stress reduction.                                                                                        Obstructive sleep apnea G47.33       Sleep apnea/nocturnal hypoxia - Not/ using the CPAP consider repeat sleep studies and order a new CPAP - The patient is complaining of day-time sleepiness(falling asleep easily/  narcolepsy) while driving or sitting still. Also patient complains of major tiredness/ fatigue, multiple episodes of night time awakenings(unexpalined). Also patient is at high risk for sleep apnea: overweight, small throat opening and enlarged  (kissing) tonsils, sedentary lifestyle, sleeping aides. Recommend: Sleep studies: Nocturnal oxymetry, sleep lab. Consider CPAP vs. Oxygen per Nasal Canula at night at 2L/min. for nocturnal hypoxia            Umbilical hernia without obstruction and without gangrene K42.9       Asymptomatic  now  - educate to avoid strain and recommend repair             Other Visit Diagnoses           Codes     Flu vaccine need     Z23     Relevant Orders     Flu vaccine, quadrivalent, high-dose, preservative free, age 65y+ (FLUZONE)                             Immunizations/Injections       very important  Immunizations from outside sources need reconciliation.       Flu vaccine, quadrivalent, high-dose, preservative free, age 65y+ (FLUZONE)12/1/2023  Pfizer COVID-19 vaccine, Fall 2023, 12 years and older, (30mcg/0.3mL)11/10/2023  Pfizer COVID-19 vaccine, bivalent, age 12 years and older (30 mcg/0.3 mL)10/26/2022  Pfizer Purple Cap SARS-CoV-21/2/2022, 4/4/2021, 3/13/2021                 Immunizations/Injections      very important  Immunizations from outside sources need reconciliation.      Flu vaccine, quadrivalent, high-dose, preservative free, age 65y+ (FLUZONE)12/1/2023  Pfizer COVID-19 vaccine, Fall 2023, 12 years and older, (30mcg/0.3mL)11/10/2023  Pfizer COVID-19 vaccine, bivalent, age 12 years and older (30 mcg/0.3 mL)10/26/2022  Pfizer Purple Cap SARS-CoV-21/2/2022, 4/4/2021, 3/13/2021  Pneumococcal conjugate vaccine, 20-valent (PREVNAR 20)5/5/2023

## 2024-04-04 NOTE — ASSESSMENT & PLAN NOTE
Refer to ENT and start low dose Spironolactone 25 mg daily and monitor and start Flonase I BID and Allegra 180 mg daily

## 2024-04-11 ENCOUNTER — OFFICE VISIT (OUTPATIENT)
Dept: OTOLARYNGOLOGY | Facility: CLINIC | Age: 69
End: 2024-04-11
Payer: MEDICARE

## 2024-04-11 ENCOUNTER — CLINICAL SUPPORT (OUTPATIENT)
Dept: AUDIOLOGY | Facility: CLINIC | Age: 69
End: 2024-04-11
Payer: MEDICARE

## 2024-04-11 VITALS — WEIGHT: 182 LBS | BODY MASS INDEX: 28.56 KG/M2 | HEIGHT: 67 IN

## 2024-04-11 DIAGNOSIS — R79.1 ABNORMAL BLOOD COAGULATION PROFILE: ICD-10-CM

## 2024-04-11 DIAGNOSIS — H93.11 TINNITUS OF RIGHT EAR: ICD-10-CM

## 2024-04-11 DIAGNOSIS — H90.3 SENSORINEURAL HEARING LOSS (SNHL) OF BOTH EARS: ICD-10-CM

## 2024-04-11 DIAGNOSIS — H61.23 BILATERAL IMPACTED CERUMEN: ICD-10-CM

## 2024-04-11 DIAGNOSIS — H93.13 TINNITUS OF BOTH EARS: ICD-10-CM

## 2024-04-11 DIAGNOSIS — H90.A21 SENSORINEURAL HEARING LOSS (SNHL) OF RIGHT EAR WITH RESTRICTED HEARING OF LEFT EAR: Primary | ICD-10-CM

## 2024-04-11 DIAGNOSIS — D37.09 NEOPLASM OF UNCERTAIN BEHAVIOR OF UVULA: ICD-10-CM

## 2024-04-11 DIAGNOSIS — H90.3 ASNHL (ASYMMETRICAL SENSORINEURAL HEARING LOSS): ICD-10-CM

## 2024-04-11 PROCEDURE — 1160F RVW MEDS BY RX/DR IN RCRD: CPT | Performed by: OTOLARYNGOLOGY

## 2024-04-11 PROCEDURE — 92567 TYMPANOMETRY: CPT

## 2024-04-11 PROCEDURE — 92557 COMPREHENSIVE HEARING TEST: CPT

## 2024-04-11 PROCEDURE — 69210 REMOVE IMPACTED EAR WAX UNI: CPT | Performed by: OTOLARYNGOLOGY

## 2024-04-11 PROCEDURE — 1159F MED LIST DOCD IN RCRD: CPT | Performed by: OTOLARYNGOLOGY

## 2024-04-11 PROCEDURE — 99204 OFFICE O/P NEW MOD 45 MIN: CPT | Performed by: OTOLARYNGOLOGY

## 2024-04-11 ASSESSMENT — ENCOUNTER SYMPTOMS
ROS GI COMMENTS: HEARTBURN
SHORTNESS OF BREATH: 1

## 2024-04-11 NOTE — PROGRESS NOTES
Subjective   Patient ID: Hanna Gee is a 68 y.o. female  HPI  Patient is complaining of a chronic history of bilateral nonpulsatile tinnitus.  She has no otalgia and no otorrhea and she has not noticed a sudden change in her hearing.    Review of Systems   HENT:  Positive for hearing loss and tinnitus.         Itchy ears, snoring, mouth lesions   Respiratory:  Positive for shortness of breath.    Gastrointestinal:         Heartburn       Objective   Physical Exam  The following elements of a brief ear nose and throat exam were performed: External ear canals and tympanic membranes, external nose and nasal passages, oral cavity, palpation of the neck, percussion of the face, palpation of the thyroid.    There is cerumen impaction bilaterally and this was cleared using speculum and curettes.  The tympanic membranes are clear and mobile.  There is a 1 cm papillomatous lesion noted on the uvula posteriorly.  The remainder of her exam was within normal limits.  An audiogram and tympanogram were ordered obtained and results were reviewed today and reveal bilateral mild to moderately severe downsloping sensorineural hearing loss with asymmetry on the right side.  The tympanograms are normal.    Ear cerumen removal    Date/Time: 4/11/2024 3:02 PM    Performed by: Missy Castillo MD  Authorized by: Missy Castillo MD    Consent:     Consent obtained:  Verbal    Risks discussed:  Pain  Procedure details:     Location:  L ear and R ear    Procedure type: curette        Assessment/Plan   Diagnoses and all orders for this visit:  Sensorineural hearing loss (SNHL) of right ear with restricted hearing of left ear (Primary)  -     Creatinine; Future  -     Blood Urea Nitrogen; Future  -     MR IAC w and wo IV contrast; Future  ASNHL (asymmetrical sensorineural hearing loss)  -     Creatinine; Future  -     Blood Urea Nitrogen; Future  -     MR IAC w and wo IV contrast; Future  Tinnitus of both ears  Neoplasm of uncertain behavior of  uvula  -     Case Request Operating Room: Excision Tissue Oral Cavity; Standing  -     Request for Pre-Admission Testing Visit; Future  -     Coagulation Screen; Future  -     CBC; Future  -     Basic Metabolic Panel; Future  -     Case Request Operating Room: Excision Tissue Oral Cavity  Bilateral impacted cerumen  -     Ear cerumen removal  Abnormal blood coagulation profile  -     Coagulation Screen; Future  Other orders  -     Place in outpatient/hospital ambulatory surgery; Standing  -     Full code; Standing  -     Vital Signs; Standing  -     Pulse oximetry, spot; Standing     1.  Bilateral mild to severe downsloping sensorineural hearing loss with asymmetry on the right side.  The patient was scheduled for an MRI of the brain IACs with contrast to rule out a retrocochlear lesion such as acoustic neuroma or multiple sclerosis or CVA.  BUN and creatinine were also ordered to evaluate her renal function prior to administration of the MRI contrast.  2.  1 cm papillomatous lesion noted on the uvula.  The patient was offered excision of the lesion under anesthesia.  The risks and benefits were explained including the option of no surgery and she would like to proceed.

## 2024-04-11 NOTE — PROGRESS NOTES
AUDIOLOGIC EVALUATION    Name:  Hanna Gee  :  1955  Age:  68 y.o.    HISTORY:     Hearing for hearing loss, as she times has difficulty understanding speech in movies or TV, and has difficulty understanding people in background noise.  She also endorsed bilateral buzzing tinnitus, possibly greater in her right ear.  She denied otalgia, aural pressure/fullness, otorrhea, dizziness, noise exposure, and prior ear surgeries.    EVALUATION:    See Audiogram.    RESULTS:    Otoscopy:  Right: Clear canal, normal color and appearance of tympanic membrane.  Left: Clear canal, normal color and appearance of tympanic membrane.    Tympanometry:  Right: Normal tympanic membrane mobility with negative middle ear pressure (-100 daPa).  Left: Normal tympanic membrane mobility with negative middle ear pressure (-95 daPa).    Hearing Sensitivity:  Right: Hearing essentially within normal limits through 3000 Hz, sloping to moderate sensorineural hearing loss.  Note, thresholds are 10 dB poorer than the left from 250-500 Hz.  Left: Hearing within normal limits through 3000 Hz, sloping to moderate sensorineural hearing loss.    Word Recognition Score (NU-6 ordered 1-2):  Right: Excellent (100%) at 60 dB.  Left: Excellent (100%) at 60 dB.    ASSESSMENT AND PLAN:    - Continue medical follow-up with Dr. Castillo.  - Counseled regarding tinnitus and tinnitus management strategies.  - Monitor and recheck hearing as warranted.  - Counseled regarding results and recommendations.      Snehal Carvalho  Clinical Audiologist

## 2024-04-12 PROBLEM — D37.09 NEOPLASM OF UNCERTAIN BEHAVIOR OF UVULA: Status: ACTIVE | Noted: 2024-04-11

## 2024-04-16 ENCOUNTER — PRE-ADMISSION TESTING (OUTPATIENT)
Dept: PREADMISSION TESTING | Facility: HOSPITAL | Age: 69
End: 2024-04-16
Payer: MEDICARE

## 2024-04-16 VITALS
HEART RATE: 68 BPM | RESPIRATION RATE: 16 BRPM | BODY MASS INDEX: 28.65 KG/M2 | OXYGEN SATURATION: 99 % | SYSTOLIC BLOOD PRESSURE: 177 MMHG | DIASTOLIC BLOOD PRESSURE: 83 MMHG | HEIGHT: 67 IN | TEMPERATURE: 97.3 F | WEIGHT: 182.54 LBS

## 2024-04-16 DIAGNOSIS — D37.09 NEOPLASM OF UNCERTAIN BEHAVIOR OF UVULA: ICD-10-CM

## 2024-04-16 DIAGNOSIS — R79.1 ABNORMAL BLOOD COAGULATION PROFILE: ICD-10-CM

## 2024-04-16 LAB
ANION GAP SERPL CALC-SCNC: 12 MMOL/L (ref 10–20)
APTT PPP: 30 SECONDS (ref 27–38)
BUN SERPL-MCNC: 13 MG/DL (ref 6–23)
CALCIUM SERPL-MCNC: 8.9 MG/DL (ref 8.6–10.3)
CHLORIDE SERPL-SCNC: 107 MMOL/L (ref 98–107)
CO2 SERPL-SCNC: 25 MMOL/L (ref 21–32)
CREAT SERPL-MCNC: 0.82 MG/DL (ref 0.5–1.05)
EGFRCR SERPLBLD CKD-EPI 2021: 79 ML/MIN/1.73M*2
ERYTHROCYTE [DISTWIDTH] IN BLOOD BY AUTOMATED COUNT: 14.6 % (ref 11.5–14.5)
GLUCOSE SERPL-MCNC: 95 MG/DL (ref 74–99)
HCT VFR BLD AUTO: 41.3 % (ref 36–46)
HGB BLD-MCNC: 13 G/DL (ref 12–16)
INR PPP: 1 (ref 0.9–1.1)
MCH RBC QN AUTO: 27.7 PG (ref 26–34)
MCHC RBC AUTO-ENTMCNC: 31.5 G/DL (ref 32–36)
MCV RBC AUTO: 88 FL (ref 80–100)
NRBC BLD-RTO: 0 /100 WBCS (ref 0–0)
PLATELET # BLD AUTO: 236 X10*3/UL (ref 150–450)
POTASSIUM SERPL-SCNC: 4.1 MMOL/L (ref 3.5–5.3)
PROTHROMBIN TIME: 10.7 SECONDS (ref 9.8–12.8)
RBC # BLD AUTO: 4.7 X10*6/UL (ref 4–5.2)
SODIUM SERPL-SCNC: 140 MMOL/L (ref 136–145)
WBC # BLD AUTO: 6.3 X10*3/UL (ref 4.4–11.3)

## 2024-04-16 PROCEDURE — 99203 OFFICE O/P NEW LOW 30 MIN: CPT | Performed by: REGISTERED NURSE

## 2024-04-16 PROCEDURE — 85027 COMPLETE CBC AUTOMATED: CPT | Performed by: OTOLARYNGOLOGY

## 2024-04-16 PROCEDURE — 80048 BASIC METABOLIC PNL TOTAL CA: CPT | Performed by: OTOLARYNGOLOGY

## 2024-04-16 PROCEDURE — 85610 PROTHROMBIN TIME: CPT | Performed by: OTOLARYNGOLOGY

## 2024-04-16 PROCEDURE — 36415 COLL VENOUS BLD VENIPUNCTURE: CPT

## 2024-04-16 ASSESSMENT — DUKE ACTIVITY SCORE INDEX (DASI)
CAN YOU TAKE CARE OF YOURSELF (EAT, DRESS, BATHE, OR USE TOILET): YES
CAN YOU WALK INDOORS, SUCH AS AROUND YOUR HOUSE: YES
TOTAL_SCORE: 32.2
CAN YOU HAVE SEXUAL RELATIONS: YES
CAN YOU DO YARD WORK LIKE RAKING LEAVES, WEEDING OR PUSHING A MOWER: NO
CAN YOU DO HEAVY WORK AROUND THE HOUSE LIKE SCRUBBING FLOORS OR LIFTING AND MOVING HEAVY FURNITURE: NO
CAN YOU DO MODERATE WORK AROUND THE HOUSE LIKE VACUUMING, SWEEPING FLOORS OR CARRYING GROCERIES: YES
CAN YOU DO LIGHT WORK AROUND THE HOUSE LIKE DUSTING OR WASHING DISHES: YES
CAN YOU RUN A SHORT DISTANCE: YES
CAN YOU CLIMB A FLIGHT OF STAIRS OR WALK UP A HILL: YES
CAN YOU WALK A BLOCK OR TWO ON LEVEL GROUND: YES
DASI METS SCORE: 6.7
CAN YOU PARTICIPATE IN MODERATE RECREATIONAL ACTIVITIES LIKE GOLF, BOWLING, DANCING, DOUBLES TENNIS OR THROWING A BASEBALL OR FOOTBALL: NO
CAN YOU PARTICIPATE IN STRENOUS SPORTS LIKE SWIMMING, SINGLES TENNIS, FOOTBALL, BASKETBALL, OR SKIING: NO

## 2024-04-16 ASSESSMENT — ENCOUNTER SYMPTOMS
RESPIRATORY NEGATIVE: 1
NECK STIFFNESS: 1
GASTROINTESTINAL NEGATIVE: 1
ARTHRALGIAS: 1
CARDIOVASCULAR NEGATIVE: 1
CONSTITUTIONAL NEGATIVE: 1
NEUROLOGICAL NEGATIVE: 1

## 2024-04-16 ASSESSMENT — PAIN SCALES - GENERAL: PAINLEVEL_OUTOF10: 1

## 2024-04-16 ASSESSMENT — PAIN - FUNCTIONAL ASSESSMENT: PAIN_FUNCTIONAL_ASSESSMENT: 0-10

## 2024-04-16 ASSESSMENT — LIFESTYLE VARIABLES: SMOKING_STATUS: SMOKER

## 2024-04-16 ASSESSMENT — CHADS2 SCORE
HYPERTENSION: NO
AGE GREATER THAN OR EQUAL TO 75: NO
CHADS2 SCORE: 0
PRIOR STROKE OR TIA OR THROMBOEMBOLISM: NO
CHF: NO
DIABETES: NO

## 2024-04-16 NOTE — H&P (VIEW-ONLY)
CPM/PAT Evaluation       Name: Hanna Gee (Hanna Gee)  /Age: 1955/68 y.o.     In-Person       Chief Complaint: Evaluation prior to surgery    HPI  68 year old female scheduled for EXCISION TISSUE ORAL CAVITY lesion on uvula on 24 with Dr. Castillo secondary to Neoplasm of uncertain behavior of uvula. PMHx includes Cervical adenopathy, Lumbar degenerative disc disease, Cough variant asthma,  POLI, lung nodules, diverticulitis, GERD. Presents to CPM today for preoperative risk stratification and optimization.   Past Medical History:   Diagnosis Date    Cervical disc disease     Diverticulitis of colon 2024    Follicular cyst of ovary 2024    GERD (gastroesophageal reflux disease)     HL (hearing loss)     Impacted cerumen 2024    Intervertebral disc disorders with radiculopathy, lumbar region 2022    Lumbar disc disease with radiculopathy    Keratitis 2024    Localized enlarged lymph nodes 2022    Cervical adenopathy    Personal history of other malignant neoplasm of skin     History of skin cancer ,scc    Photokeratitis, unspecified eye 10/30/2017    Actinic keratitis    Right lower quadrant abdominal pain 2024    Sleep apnea     no device used    Sprain of tibiofibular ligament of ankle 2023    Vision loss        Past Surgical History:   Procedure Laterality Date    COLONOSCOPY      has had multiple       Patient  has no history on file for sexual activity.    Family History   Problem Relation Name Age of Onset    Diabetes Other      Cancer Other      Heart disease Other      Hypertension Other      Allergies Other         Allergies   Allergen Reactions    Azithromycin Palpitations       Prior to Admission medications    Medication Sig Start Date End Date Taking? Authorizing Provider   fexofenadine (Allegra) 180 mg tablet Take 1 tablet (180 mg) by mouth once daily.  Patient not taking: Reported on 2024  Leonid Berger MD    fluticasone (Flonase Sensimist) 27.5 mcg/actuation nasal spray Administer 1 spray into each nostril once daily.  Patient not taking: Reported on 4/16/2024 4/4/24 4/4/25  Leonid Berger MD   spironolactone (Aldactone) 25 mg tablet Take 1 tablet (25 mg) by mouth once daily.  Patient not taking: Reported on 4/16/2024 4/4/24 10/1/24  Leonid Berger MD        PAT ROS:   Constitutional:   neg    Neuro/Psych:   neg    Eyes:    use of corrective lenses  Ears:   neg    Nose:   Mouth:   neg    Throat:   neg    Neck:    Stiffness with ROM   neck stiffness  Cardio:   neg    Respiratory:   neg    Endocrine:   GI:   neg    :   neg    Musculoskeletal:    arthralgias  Hematologic:   neg    Skin:  neg        Physical Exam  Vitals reviewed.   Constitutional:       Appearance: Normal appearance.   HENT:      Head: Normocephalic and atraumatic.      Nose: Nose normal.      Mouth/Throat:      Mouth: Mucous membranes are moist.      Pharynx: Oropharynx is clear.   Eyes:      Comments: Left lower eyelid stye   Neck:      Vascular: No carotid bruit.      Comments: Stiffness with ROM  Cardiovascular:      Rate and Rhythm: Normal rate and regular rhythm.      Pulses: Normal pulses.      Heart sounds: Normal heart sounds.   Pulmonary:      Effort: Pulmonary effort is normal.      Breath sounds: Normal breath sounds.   Abdominal:      Palpations: Abdomen is soft.   Musculoskeletal:         General: Tenderness present.      Cervical back: Neck supple.   Skin:     General: Skin is warm and dry.      Capillary Refill: Capillary refill takes less than 2 seconds.   Neurological:      General: No focal deficit present.      Mental Status: She is alert and oriented to person, place, and time. Mental status is at baseline.   Psychiatric:         Mood and Affect: Mood normal.         Behavior: Behavior normal.         Thought Content: Thought content normal.         Judgment: Judgment normal.          PAT AIRWAY:   Airway:      Mallampati::  II    TM distance::  >3 FB    Neck ROM::  Limited  normal        Visit Vitals  /83   Pulse 68   Temp 36.3 °C (97.3 °F) (Tympanic)   Resp 16       DASI Risk Score      Flowsheet Row Most Recent Value   DASI SCORE 32.2   METS Score (Will be calculated only when all the questions are answered) 6.7          Caprini DVT Assessment      Flowsheet Row Most Recent Value   DVT Score 6   Current Status Major surgery planned, including arthroscopic and laproscopic (1-2 hours)   History Inflammatory bowel disease   Age 60-75 years   BMI 30 or less          Modified Frailty Index    No data to display       CHADS2 Stroke Risk  Current as of 22 minutes ago        N/A 3 to 100%: High Risk   2 to < 3%: Medium Risk   0 to < 2%: Low Risk     Last Change: N/A          This score determines the patient's risk of having a stroke if the patient has atrial fibrillation.        This score is not applicable to this patient. Components are not calculated.          Revised Cardiac Risk Index      Flowsheet Row Most Recent Value   Revised Cardiac Risk Calculator 0          Apfel Simplified Score      Flowsheet Row Most Recent Value   Apfel Simplified Score Calculator 1          Risk Analysis Index Results This Encounter    No data found in the last 1 encounters.       Stop Bang Score      Flowsheet Row Most Recent Value   Do you snore loudly? 1   Do you often feel tired or fatigued after your sleep? 0   Has anyone ever observed you stop breathing in your sleep? 1   Do you have or are you being treated for high blood pressure? 0   Recent BMI (Calculated) 28.5   Is BMI greater than 35 kg/m2? 0=No   Age older than 50 years old? 1=Yes   Is your neck circumference greater than 17 inches (Male) or 16 inches (Female)? 0   Gender - Male 0=No   STOP-BANG Total Score 3            Assessment and Plan:     Anesthesia:  The patient denies problems with anesthesia in the past such as PONV, prolonged sedation, awareness, dental damage,  aspiration, cardiac arrest, difficult intubation, or unexpected hospital admissions.     Neuro:   The patient has no neurological diagnoses or significant findings on chart review, clinical presentation, and evaluation. No grossly apparent neurological perioperative risk. The patient is at increased risk for perioperative stroke secondary to increased age, female gender. Handouts for preoperative brain exercises given to patient.    HEENT/Airway  The patient has diagnoses, significant findings on chart review, clinical presentation or evaluation of POLI, sleep apnea/nocturnal hypoxia. Cervical disc disease,  Neoplasm of uncertain behavior of uvula. Patient to notify Dr. Castillo office of left eye stye.     Cardiovascular  5/12/22 Nuclear Stress Test  FINDINGS:  Stress and rest images both demonstrate a normal distribution of  perfusion throughout all LV segments with no sign of ischemia.     ECG-gated images demonstrate normal LV size and myocardial  contractility with an LV ejection fraction of   greater than 65 %  (normal above 45 percent).     IMPRESSION:  1. Normal stress myocardial perfusion imaging in response to  pharmacologic stress without evidence of ischemia or infarct.  2. Well-maintained left ventricular function with an ejection  fraction of greater than 65%.  3. Normal left ventricular size.     RCRI  The patient meets 0-1 RCRI criteria and therefore has a less than 1% risk of major adverse cardiac complications.  METS  The patient's functional capacity capacity is greater than 4 METS.  EKG  EKG 2/23/24  Normal EKG with no ischemic changes and normal rhythm   Heart Failure  The patient has no known history of heart failure.  Additionally, the patient reports no symptoms of heart failure and demonstrates no signs of heart failure.  Hypertension Evaluation  The patient has no known history of hypertension but presents with an elevated blood pressure today  Heart Rhythm Evaluation  The patient has no  history of arrhythmias.  Heart Valve Evaluation  The patient has no known history of valvular heart disease. The patient has no symptoms or physical exam findings to suggest valvular heart disease.  Cardiology Evaluation  The patient is not followed by cardiology.    ISH score which indicates a 0.1% risk of intraoperative or 30-day postoperative.    Pulmonary   The patient has findings on chart review, clinical presentation and evaluation significant for Asthma, lung nodules, POLI, does not use Cpap. Was recommended to consider repeat sleep study and order new Cpap.    The patient has a stop bang score of 3, which places patient at intermediate risk for having POLI.    ARISCAT 3, low, 1.6% risk of in-hospital postoperative pulmonary complications  PRODIGY 13, intermediate risk of respiratory depression episode. Patient given PI sheet for preoperative deep breathing exercises.    Hematology  No diagnoses or significant findings on chart review or clinical presentation and evaluation.  Antiplatelet management   The patient is not currently receiving antiplatelet therapy.  Anticoagulation management  The patient is not currently receiving anticoagulation therapy.    Caprini score 6, high risk of perioperative VTE.     Patient instructed to ambulate as soon as possible postoperatively to decrease thromboembolic risk. Initiate mechanical DVT prophylaxis as soon as possible and initiate chemical prophylaxis when deemed safe from a bleeding standpoint post surgery.         Gastrointestinal  The patient has diagnoses or significant findings on chart review or clinical presentation and evaluation significant for GERD on PPI, diverticulitis.  Eat 10- 0,  self-perceived oropharyngeal dysphagia scale (0-40)     Genitourinary  No diagnoses or significant findings on chart review or clinical presentation and evaluation.    Renal  The patient has no known history of chronic kidney disease.    Musculoskeletal  The patient has  diagnoses or significant findings on chart review or clinical presentation and evaluation significant for DDD lumbar/cervical spine    Endocrine  Diabetes Evaluation  The patient has no history of diabetes mellitus.  Thyroid Disease Evaluation  The patient has no history of thyroid disease.    ID  No diagnoses or significant findings on chart review or clinical presentation and evaluation.    -Preoperative medication instructions were provided and reviewed with the patient.  Any additional testing or evaluation was explained to the patient.  NPO Instructions were discussed, and the patient's questions were answered prior to conclusion of this encounter.

## 2024-04-16 NOTE — PREPROCEDURE INSTRUCTIONS
Medication List            Accurate as of April 16, 2024 10:55 AM. Always use your most recent med list.                fexofenadine 180 mg tablet  Commonly known as: Allegra  Take 1 tablet (180 mg) by mouth once daily.     Flonase Sensimist 27.5 mcg/actuation nasal spray  Generic drug: fluticasone  Administer 1 spray into each nostril once daily.     spironolactone 25 mg tablet  Commonly known as: Aldactone  Take 1 tablet (25 mg) by mouth once daily.              SURGERY PRE-OPERATIVE INSTRUCTIONS    *You will receive a phone call the day before your procedure  after 2pm, (or the Friday before your surgery if scheduled on a Monday.) Generally the hospital will be calling you with this information after that time.    *You are not to eat after midnight the night before the surgery. You may have 8oz of a clear liquid up until 2 hours prior to arriving to the hospital. The exception is with medications you were instructed to take day of surgery.    *You may take tylenol for pain/discomfort as needed.     *Stop taking all aspirin products, ibuprofen (motrin/advil), naproxen (aleve/naprosyn) for one week prior to surgery.    *Stop taking all vitamins and supplements one week prior to surgery.     *You should not have alcoholic beverages for 24 hours before surgery.     *You should not smoke 24 hours prior to surgery.     *To help prevent surgical infections bathe/shower with Dial soap the evening before surgery.    *You can wear deodorant but no lotion, powder, or perfume/cologne. You should remove all make-up and nail polish at home.    *If you wear glasses, please bring a case for the glasses with you.    *You will be asked to remove dentures and contacts.     *Please leave all valuables at home.    *You should wear loose, comfortable clothing that will accommodate bandages and/or casts.    *You should notify your doctor of any change in your condition (fever, cold, rash, etc). Surgery may need to be re-scheduled  until a time you are in better health.    *A responsible adult is required to accompany you to and from the hospital if you are receiving anesthesia or a sedative. Patients are not permitted to drive for 24 hours after anesthesia.     *You can use the [facility] parking if you wish.     *If you have any further questions please call [phone].                 NPO Instructions:        Additional Instructions:

## 2024-04-17 ENCOUNTER — HOSPITAL ENCOUNTER (OUTPATIENT)
Dept: RADIOLOGY | Facility: CLINIC | Age: 69
Discharge: HOME | End: 2024-04-17
Payer: MEDICARE

## 2024-04-17 DIAGNOSIS — Z12.31 SCREENING MAMMOGRAM FOR BREAST CANCER: ICD-10-CM

## 2024-04-17 PROCEDURE — 77067 SCR MAMMO BI INCL CAD: CPT | Performed by: RADIOLOGY

## 2024-04-17 PROCEDURE — 77063 BREAST TOMOSYNTHESIS BI: CPT | Performed by: RADIOLOGY

## 2024-04-17 PROCEDURE — 77067 SCR MAMMO BI INCL CAD: CPT

## 2024-04-18 ENCOUNTER — ANESTHESIA EVENT (OUTPATIENT)
Dept: OPERATING ROOM | Facility: HOSPITAL | Age: 69
End: 2024-04-18
Payer: MEDICARE

## 2024-04-19 ENCOUNTER — TELEPHONE (OUTPATIENT)
Dept: OTOLARYNGOLOGY | Facility: CLINIC | Age: 69
End: 2024-04-19

## 2024-04-19 ENCOUNTER — HOSPITAL ENCOUNTER (OUTPATIENT)
Facility: HOSPITAL | Age: 69
Setting detail: OUTPATIENT SURGERY
Discharge: HOME | End: 2024-04-19
Attending: OTOLARYNGOLOGY | Admitting: OTOLARYNGOLOGY
Payer: MEDICARE

## 2024-04-19 ENCOUNTER — ANESTHESIA (OUTPATIENT)
Dept: OPERATING ROOM | Facility: HOSPITAL | Age: 69
End: 2024-04-19
Payer: MEDICARE

## 2024-04-19 VITALS
HEART RATE: 74 BPM | RESPIRATION RATE: 16 BRPM | WEIGHT: 182.1 LBS | BODY MASS INDEX: 28.58 KG/M2 | DIASTOLIC BLOOD PRESSURE: 77 MMHG | OXYGEN SATURATION: 98 % | SYSTOLIC BLOOD PRESSURE: 143 MMHG | TEMPERATURE: 97.2 F | HEIGHT: 67 IN

## 2024-04-19 DIAGNOSIS — D37.01 NEOPLASM OF UNCERTAIN BEHAVIOR OF LIP, ORAL CAVITY, AND PHARYNX: ICD-10-CM

## 2024-04-19 DIAGNOSIS — D37.09 NEOPLASM OF UNCERTAIN BEHAVIOR OF UVULA: ICD-10-CM

## 2024-04-19 DIAGNOSIS — D37.09 NEOPLASM OF UNCERTAIN BEHAVIOR OF LIP, ORAL CAVITY, AND PHARYNX: ICD-10-CM

## 2024-04-19 DIAGNOSIS — D37.05 NEOPLASM OF UNCERTAIN BEHAVIOR OF LIP, ORAL CAVITY, AND PHARYNX: ICD-10-CM

## 2024-04-19 DIAGNOSIS — H90.A21 SENSORINEURAL HEARING LOSS (SNHL) OF RIGHT EAR WITH RESTRICTED HEARING OF LEFT EAR: Primary | ICD-10-CM

## 2024-04-19 PROCEDURE — 7100000009 HC PHASE TWO TIME - INITIAL BASE CHARGE: Performed by: OTOLARYNGOLOGY

## 2024-04-19 PROCEDURE — 3600000008 HC OR TIME - EACH INCREMENTAL 1 MINUTE - PROCEDURE LEVEL THREE: Performed by: OTOLARYNGOLOGY

## 2024-04-19 PROCEDURE — A40812 PR EXCIS MOUTH MUCOSA/SUB,SIMPL REPAIR: Performed by: NURSE ANESTHETIST, CERTIFIED REGISTERED

## 2024-04-19 PROCEDURE — 3700000001 HC GENERAL ANESTHESIA TIME - INITIAL BASE CHARGE: Performed by: OTOLARYNGOLOGY

## 2024-04-19 PROCEDURE — 7100000001 HC RECOVERY ROOM TIME - INITIAL BASE CHARGE: Performed by: OTOLARYNGOLOGY

## 2024-04-19 PROCEDURE — 7100000002 HC RECOVERY ROOM TIME - EACH INCREMENTAL 1 MINUTE: Performed by: OTOLARYNGOLOGY

## 2024-04-19 PROCEDURE — 2500000005 HC RX 250 GENERAL PHARMACY W/O HCPCS: Performed by: ANESTHESIOLOGY

## 2024-04-19 PROCEDURE — 40812 EXCISE/REPAIR MOUTH LESION: CPT | Performed by: OTOLARYNGOLOGY

## 2024-04-19 PROCEDURE — 3600000003 HC OR TIME - INITIAL BASE CHARGE - PROCEDURE LEVEL THREE: Performed by: OTOLARYNGOLOGY

## 2024-04-19 PROCEDURE — 3700000002 HC GENERAL ANESTHESIA TIME - EACH INCREMENTAL 1 MINUTE: Performed by: OTOLARYNGOLOGY

## 2024-04-19 PROCEDURE — 2500000005 HC RX 250 GENERAL PHARMACY W/O HCPCS: Performed by: OTOLARYNGOLOGY

## 2024-04-19 PROCEDURE — 7100000010 HC PHASE TWO TIME - EACH INCREMENTAL 1 MINUTE: Performed by: OTOLARYNGOLOGY

## 2024-04-19 PROCEDURE — 2500000004 HC RX 250 GENERAL PHARMACY W/ HCPCS (ALT 636 FOR OP/ED): Performed by: NURSE ANESTHETIST, CERTIFIED REGISTERED

## 2024-04-19 PROCEDURE — 2500000001 HC RX 250 WO HCPCS SELF ADMINISTERED DRUGS (ALT 637 FOR MEDICARE OP): Performed by: NURSE ANESTHETIST, CERTIFIED REGISTERED

## 2024-04-19 PROCEDURE — 88305 TISSUE EXAM BY PATHOLOGIST: CPT | Performed by: DENTIST

## 2024-04-19 PROCEDURE — 88305 TISSUE EXAM BY PATHOLOGIST: CPT | Mod: TC,59,GEALAB | Performed by: OTOLARYNGOLOGY

## 2024-04-19 PROCEDURE — 2500000005 HC RX 250 GENERAL PHARMACY W/O HCPCS: Performed by: NURSE ANESTHETIST, CERTIFIED REGISTERED

## 2024-04-19 PROCEDURE — 2500000004 HC RX 250 GENERAL PHARMACY W/ HCPCS (ALT 636 FOR OP/ED): Performed by: ANESTHESIOLOGY

## 2024-04-19 RX ORDER — LIDOCAINE HYDROCHLORIDE AND EPINEPHRINE 10; 10 MG/ML; UG/ML
INJECTION, SOLUTION INFILTRATION; PERINEURAL AS NEEDED
Status: DISCONTINUED | OUTPATIENT
Start: 2024-04-19 | End: 2024-04-19 | Stop reason: HOSPADM

## 2024-04-19 RX ORDER — ONDANSETRON HYDROCHLORIDE 2 MG/ML
INJECTION, SOLUTION INTRAVENOUS AS NEEDED
Status: DISCONTINUED | OUTPATIENT
Start: 2024-04-19 | End: 2024-04-19

## 2024-04-19 RX ORDER — FENTANYL CITRATE 50 UG/ML
INJECTION, SOLUTION INTRAMUSCULAR; INTRAVENOUS AS NEEDED
Status: DISCONTINUED | OUTPATIENT
Start: 2024-04-19 | End: 2024-04-19

## 2024-04-19 RX ORDER — LIDOCAINE HCL/PF 100 MG/5ML
SYRINGE (ML) INTRAVENOUS AS NEEDED
Status: DISCONTINUED | OUTPATIENT
Start: 2024-04-19 | End: 2024-04-19

## 2024-04-19 RX ORDER — SODIUM CHLORIDE, SODIUM LACTATE, POTASSIUM CHLORIDE, CALCIUM CHLORIDE 600; 310; 30; 20 MG/100ML; MG/100ML; MG/100ML; MG/100ML
100 INJECTION, SOLUTION INTRAVENOUS CONTINUOUS
Status: DISCONTINUED | OUTPATIENT
Start: 2024-04-19 | End: 2024-04-19 | Stop reason: HOSPADM

## 2024-04-19 RX ORDER — DROPERIDOL 2.5 MG/ML
0.62 INJECTION, SOLUTION INTRAMUSCULAR; INTRAVENOUS ONCE AS NEEDED
Status: DISCONTINUED | OUTPATIENT
Start: 2024-04-19 | End: 2024-04-19 | Stop reason: HOSPADM

## 2024-04-19 RX ORDER — PROPOFOL 10 MG/ML
INJECTION, EMULSION INTRAVENOUS AS NEEDED
Status: DISCONTINUED | OUTPATIENT
Start: 2024-04-19 | End: 2024-04-19

## 2024-04-19 RX ORDER — MIDAZOLAM HYDROCHLORIDE 1 MG/ML
INJECTION INTRAMUSCULAR; INTRAVENOUS AS NEEDED
Status: DISCONTINUED | OUTPATIENT
Start: 2024-04-19 | End: 2024-04-19

## 2024-04-19 RX ORDER — ONDANSETRON HYDROCHLORIDE 2 MG/ML
4 INJECTION, SOLUTION INTRAVENOUS ONCE AS NEEDED
Status: DISCONTINUED | OUTPATIENT
Start: 2024-04-19 | End: 2024-04-19 | Stop reason: HOSPADM

## 2024-04-19 RX ORDER — GLYCOPYRROLATE 0.2 MG/ML
INJECTION INTRAMUSCULAR; INTRAVENOUS AS NEEDED
Status: DISCONTINUED | OUTPATIENT
Start: 2024-04-19 | End: 2024-04-19

## 2024-04-19 RX ORDER — KETOROLAC TROMETHAMINE 30 MG/ML
INJECTION, SOLUTION INTRAMUSCULAR; INTRAVENOUS AS NEEDED
Status: DISCONTINUED | OUTPATIENT
Start: 2024-04-19 | End: 2024-04-19

## 2024-04-19 RX ORDER — OXYCODONE HYDROCHLORIDE 5 MG/1
5 TABLET ORAL EVERY 4 HOURS PRN
Status: DISCONTINUED | OUTPATIENT
Start: 2024-04-19 | End: 2024-04-19 | Stop reason: HOSPADM

## 2024-04-19 RX ORDER — ROCURONIUM BROMIDE 10 MG/ML
INJECTION, SOLUTION INTRAVENOUS AS NEEDED
Status: DISCONTINUED | OUTPATIENT
Start: 2024-04-19 | End: 2024-04-19

## 2024-04-19 RX ORDER — LIDOCAINE HYDROCHLORIDE 40 MG/ML
SOLUTION TOPICAL AS NEEDED
Status: DISCONTINUED | OUTPATIENT
Start: 2024-04-19 | End: 2024-04-19

## 2024-04-19 RX ADMIN — SODIUM CHLORIDE, POTASSIUM CHLORIDE, SODIUM LACTATE AND CALCIUM CHLORIDE 100 ML/HR: 600; 310; 30; 20 INJECTION, SOLUTION INTRAVENOUS at 07:33

## 2024-04-19 RX ADMIN — Medication 6 L/MIN: at 09:20

## 2024-04-19 RX ADMIN — FENTANYL CITRATE 50 MCG: 50 INJECTION, SOLUTION INTRAMUSCULAR; INTRAVENOUS at 08:53

## 2024-04-19 RX ADMIN — LIDOCAINE HYDROCHLORIDE 4 ML: 40 SOLUTION TOPICAL at 08:49

## 2024-04-19 RX ADMIN — SUGAMMADEX 200 MG: 100 INJECTION, SOLUTION INTRAVENOUS at 09:05

## 2024-04-19 RX ADMIN — LIDOCAINE HYDROCHLORIDE 100 MG: 20 INJECTION INTRAVENOUS at 08:47

## 2024-04-19 RX ADMIN — SUGAMMADEX 200 MG: 100 INJECTION, SOLUTION INTRAVENOUS at 09:11

## 2024-04-19 RX ADMIN — PROPOFOL 160 MG: 10 INJECTION, EMULSION INTRAVENOUS at 08:47

## 2024-04-19 RX ADMIN — DEXAMETHASONE SODIUM PHOSPHATE 4 MG: 4 INJECTION INTRA-ARTICULAR; INTRALESIONAL; INTRAMUSCULAR; INTRAVENOUS; SOFT TISSUE at 08:53

## 2024-04-19 RX ADMIN — GLYCOPYRROLATE 0.2 MG: 0.2 INJECTION, SOLUTION INTRAMUSCULAR; INTRAVENOUS at 08:44

## 2024-04-19 RX ADMIN — FENTANYL CITRATE 50 MCG: 50 INJECTION, SOLUTION INTRAMUSCULAR; INTRAVENOUS at 08:54

## 2024-04-19 RX ADMIN — ROCURONIUM BROMIDE 50 MG: 10 INJECTION, SOLUTION INTRAVENOUS at 08:47

## 2024-04-19 RX ADMIN — ONDANSETRON 4 MG: 2 INJECTION INTRAMUSCULAR; INTRAVENOUS at 08:52

## 2024-04-19 RX ADMIN — ONDANSETRON 4 MG: 2 INJECTION INTRAMUSCULAR; INTRAVENOUS at 08:57

## 2024-04-19 RX ADMIN — MIDAZOLAM HYDROCHLORIDE 2 MG: 1 INJECTION, SOLUTION INTRAMUSCULAR; INTRAVENOUS at 08:35

## 2024-04-19 RX ADMIN — KETOROLAC TROMETHAMINE 30 MG: 30 INJECTION, SOLUTION INTRAMUSCULAR at 08:53

## 2024-04-19 SDOH — HEALTH STABILITY: MENTAL HEALTH: CURRENT SMOKER: 0

## 2024-04-19 ASSESSMENT — COLUMBIA-SUICIDE SEVERITY RATING SCALE - C-SSRS
1. IN THE PAST MONTH, HAVE YOU WISHED YOU WERE DEAD OR WISHED YOU COULD GO TO SLEEP AND NOT WAKE UP?: NO
2. HAVE YOU ACTUALLY HAD ANY THOUGHTS OF KILLING YOURSELF?: NO
6. HAVE YOU EVER DONE ANYTHING, STARTED TO DO ANYTHING, OR PREPARED TO DO ANYTHING TO END YOUR LIFE?: NO

## 2024-04-19 ASSESSMENT — PAIN - FUNCTIONAL ASSESSMENT: PAIN_FUNCTIONAL_ASSESSMENT: 0-10

## 2024-04-19 ASSESSMENT — PAIN SCALES - GENERAL
PAINLEVEL_OUTOF10: 0 - NO PAIN

## 2024-04-19 NOTE — ANESTHESIA POSTPROCEDURE EVALUATION
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])    Surgeons: Missy Castillo MD Responsible Provider: KATTY Weber    Anesthesia Type: general ASA Status: 2            Anesthesia Type: general    Vitals Value Taken Time   /84 04/19/24 0950   Temp 36.2 °C (97.2 °F) 04/19/24 0920   Pulse 63 04/19/24 0950   Resp 14 04/19/24 0950   SpO2 96 % 04/19/24 0950       Anesthesia Post Evaluation    Patient location during evaluation: PACU  Patient participation: complete - patient participated  Level of consciousness: awake  Pain management: adequate  Multimodal analgesia pain management approach  Airway patency: patent  Two or more strategies used to mitigate risk of obstructive sleep apnea  Cardiovascular status: acceptable  Respiratory status: acceptable  Hydration status: acceptable  Postoperative Nausea and Vomiting: none        No notable events documented.

## 2024-04-19 NOTE — OP NOTE
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]     * Neoplasm of uncertain behavior of lip, oral cavity, and pharynx [D37.01, D37.05, D37.09]     Procedures  EXCISION TISSUE ORAL CAVITY  24877 - [place] EXC LESION MUCOSA & SBMCSL VESTIBULE SMPL RPR      Surgeons      * Missy Castillo - Primary    Resident/Fellow/Other Assistant:  Surgeons and Role:  * No surgeons found with a matching role *    Procedure Summary  Anesthesia: Consult  ASA: II  Anesthesia Staff: CRNA: ROXI Weber-CRNA  Estimated Blood Loss: 1 mL  Intra-op Medications:   Administrations occurring from 0815 to 0915 on 24:   Medication Name Total Dose   lidocaine-epinephrine (Xylocaine W/EPI) 1 %-1:100,000 injection 1 mL              Anesthesia Record               Intraprocedure I/O Totals       None           Specimen:   ID Type Source Tests Collected by Time   1 : UVULA LESION Tissue UVULA SURGICAL PATHOLOGY EXAM Missy Castillo MD 2024 0900   2 : LIP LESION Tissue LIP LOWER RIGHT BIOPSY SURGICAL PATHOLOGY EXAM Missy Castillo MD 2024 0904        Staff:   Circulator: Deepika La RN  Relief Circulator: Cathi Delgadillo RN  Relief Scrub: Cristal España  Scrub Person: Iwona Santos RN         Drains and/or Catheters: * None in log *    Tourniquet Times:         Implants:     Findings: 1 cm papillomatous tubular lesion and 5 mm fibrous lip mucosal lesion    Indications: Hanna Gee is an 68 y.o. 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     The patient was seen in the preoperative area. The risks, benefits, complications, treatment options, non-operative alternatives, expected  recovery and outcomes were discussed with the patient. The possibilities of reaction to medication, pulmonary aspiration, injury to surrounding structures, bleeding, recurrent infection, the need for additional procedures, failure to diagnose a condition, and creating a complication requiring transfusion or operation were discussed with the patient. The patient concurred with the proposed plan, giving informed consent.  The site of surgery was properly noted/marked if necessary per policy. The patient has been actively warmed in preoperative area. Preoperative antibiotics are not indicated. Venous thrombosis prophylaxis are not indicated.    Procedure Details: Patient was taken to the operating room and placed in supine position.  General tracheal anesthesia was administered by anesthesia service.  The oral cavity was examined using a MyDocTimer mouthgag and the lesion on the tip of the uvula on the right side was identified.  1% lidocaine with 1/100,000 epinephrine was injected into the base of the lesion.  The lesion with approximately 2 mm mucosal margin was then excised using sharp dissection.  The defect measured approximately 1 cm.  Bleeding was controlled using insulated bipolar cautery at 7 W and the mucosa was then approximated using 4-0 chromic sutures in an interrupted buried fashion.  Attention was then turned to the lip mucosal lesion.  1% lidocaine with 1/100,000 epinephrine was injected into the submucosa surrounding the left lesion.  An elliptical incision was then performed around the lesion and the lesion was excised.  The defect measured approximately 1 cm.  The edges of the mucosa was then approximated using 4-0 chromic sutures in interrupted buried fashion.  Bacitracin ointment was applied and the patient was then awakened and extubated and returned to recovery in stable condition and there were no complications.  Complications:  None; patient tolerated the procedure well.    Disposition: PACU -  hemodynamically stable.  Condition: stable         Additional Details:     Attending Attestation: I was present and scrubbed for the entire procedure.    Missy Castillo  Phone Number: [phone]

## 2024-04-19 NOTE — ANESTHESIA PROCEDURE NOTES
Airway  Date/Time: 4/19/2024 8:49 AM  Urgency: elective    Airway not difficult    Staffing  Performed: CRNA   Authorized by: KATTY Weber    Performed by: KATTY Weber  Patient location during procedure: OR    Indications and Patient Condition  Indications for airway management: anesthesia  Spontaneous Ventilation: absent  Sedation level: deep  Preoxygenated: yes  Patient position: sniffing  MILS maintained throughout  Mask difficulty assessment: 1 - vent by mask  Planned trial extubation    Final Airway Details  Final airway type: endotracheal airway      Successful airway: ETT  Cuffed: yes   Successful intubation technique: video laryngoscopy  Facilitating devices/methods: cricoid pressure  Endotracheal tube insertion site: oral  Blade size: #4  Cormack-Lehane Classification: grade I - full view of glottis  Placement verified by: chest auscultation   Cuff volume (mL): 7  Measured from: lips  ETT to lips (cm): 21  Number of attempts at approach: 1  Ventilation between attempts: BVM  Number of other approaches attempted: 0

## 2024-04-19 NOTE — ANESTHESIA PREPROCEDURE EVALUATION
Patient: Hanna Gee    Procedure Information       Date/Time: 04/19/24 0815    Procedure: EXCISION TISSUE ORAL CAVITY - LESION ON UVULA    Location: [facility] / [facility]    Surgeons: Missy Castillo MD          Vitals:    04/19/24 0647   BP: 164/90   Pulse: 90   Resp: 16   Temp: 36.3 °C (97.3 °F)   SpO2: 97%       Past Surgical History:   Procedure Laterality Date    COLONOSCOPY      has had multiple     Past Medical History:   Diagnosis Date    Cervical disc disease     Diverticulitis of colon 04/04/2024    Follicular cyst of ovary 04/04/2024    GERD (gastroesophageal reflux disease)     HL (hearing loss)     Impacted cerumen 04/04/2024    Intervertebral disc disorders with radiculopathy, lumbar region 04/11/2022    Lumbar disc disease with radiculopathy    Keratitis 04/04/2024    Localized enlarged lymph nodes 04/22/2022    Cervical adenopathy    Personal history of other malignant neoplasm of skin     History of skin cancer ,scc    Photokeratitis, unspecified eye 10/30/2017    Actinic keratitis    Right lower quadrant abdominal pain 04/04/2024    Sleep apnea     no device used    Sprain of tibiofibular ligament of ankle 12/01/2023    Vision loss        Current Facility-Administered Medications:     lactated Ringer's infusion, 100 mL/hr, intravenous, Continuous, Shahriar Cuenca MD, Last Rate: 100 mL/hr at 04/19/24 0733, 100 mL/hr at 04/19/24 0733  Prior to Admission medications    Medication Sig Start Date End Date Taking? Authorizing Provider   fexofenadine (Allegra) 180 mg tablet Take 1 tablet (180 mg) by mouth once daily.  Patient not taking: Reported on 4/16/2024 4/4/24 4/4/25  Leonid Berger MD   fluticasone (Flonase Sensimist) 27.5 mcg/actuation nasal spray Administer 1 spray into each nostril once daily.  Patient not taking: Reported on 4/16/2024 4/4/24 4/4/25  Leonid Berger MD   spironolactone (Aldactone) 25 mg tablet Take 1 tablet (25 mg) by mouth once daily.  Patient not taking:  Reported on 4/16/2024 4/4/24 10/1/24  Leonid Berger MD     Allergies   Allergen Reactions    Azithromycin Palpitations     Social History     Tobacco Use    Smoking status: Every Day     Current packs/day: 0.50     Types: Cigarettes    Smokeless tobacco: Never   Substance Use Topics    Alcohol use: Yes     Alcohol/week: 7.0 standard drinks of alcohol     Types: 7 Glasses of wine per week     Comment: 2 glasses of wine daily         Chemistry    Lab Results   Component Value Date/Time     04/16/2024 1108    K 4.1 04/16/2024 1108     04/16/2024 1108    CO2 25 04/16/2024 1108    BUN 13 04/16/2024 1108    CREATININE 0.82 04/16/2024 1108    Lab Results   Component Value Date/Time    CALCIUM 8.9 04/16/2024 1108    ALKPHOS 55 02/23/2024 0952    AST 14 02/23/2024 0952    ALT 16 02/23/2024 0952    BILITOT 0.7 02/23/2024 0952          Lab Results   Component Value Date/Time    WBC 6.3 04/16/2024 1108    HGB 13.0 04/16/2024 1108    HCT 41.3 04/16/2024 1108     04/16/2024 1108     Lab Results   Component Value Date/Time    PROTIME 10.7 04/16/2024 1108    INR 1.0 04/16/2024 1108     Encounter Date: 02/23/24   ECG 12 lead    Narrative    Normal EKG with no ischemic changes and normal rhythm      No results found for this or any previous visit from the past 1095 days.      Relevant Problems   Pulmonary   (+) MATIAS (dyspnea on exertion)   (+) Lung nodules   (+) Obstructive sleep apnea      Neuro   (+) Cervical radiculopathy   (+) Lumbar disc disease with radiculopathy   (+) Lumbar radiculopathy      GI   (+) Acid reflux      Liver   (+) Asymptomatic cholelithiasis   (+) Benign liver cyst      Musculoskeletal   (+) Lumbar degenerative disc disease   (+) Lumbar disc disease with radiculopathy   (+) Right knee DJD      HEENT   (+) Acute non-recurrent maxillary sinusitis   (+) Sensorineural hearing loss (SNHL) of both ears      ID   (+) Acute bronchitis due to other specified organisms   (+) EBV (Deepali-Barr  virus) viremia   (+) EBV infection       Clinical information reviewed:   Tobacco  Allergies  Meds   Med Hx  Surg Hx   Fam Hx  Soc Hx        NPO Detail:  NPO/Void Status  Date of Last Liquid: 04/18/24  Time of Last Liquid: 2359  Date of Last Solid: 04/18/24  Time of Last Solid: 1930         Physical Exam    Airway  Mallampati: II     Cardiovascular - normal exam     Dental    Pulmonary    Abdominal            Anesthesia Plan    History of general anesthesia?: yes  History of complications of general anesthesia?: no    ASA 2     general     The patient is not a current smoker.  Patient was not previously instructed to abstain from smoking on day of procedure.  Patient did not smoke on day of procedure.  Education provided regarding risk of obstructive sleep apnea.  intravenous induction   Anesthetic plan and risks discussed with patient.    Plan discussed with CRNA.

## 2024-04-23 LAB
LABORATORY COMMENT REPORT: NORMAL
PATH REPORT.FINAL DX SPEC: NORMAL
PATH REPORT.GROSS SPEC: NORMAL
PATH REPORT.RELEVANT HX SPEC: NORMAL
PATH REPORT.TOTAL CANCER: NORMAL

## 2024-04-25 ENCOUNTER — HOSPITAL ENCOUNTER (OUTPATIENT)
Dept: RADIOLOGY | Facility: HOSPITAL | Age: 69
Discharge: HOME | End: 2024-04-25
Payer: MEDICARE

## 2024-04-25 DIAGNOSIS — H90.A21 SENSORINEURAL HEARING LOSS (SNHL) OF RIGHT EAR WITH RESTRICTED HEARING OF LEFT EAR: ICD-10-CM

## 2024-04-25 DIAGNOSIS — H90.3 ASNHL (ASYMMETRICAL SENSORINEURAL HEARING LOSS): ICD-10-CM

## 2024-04-25 PROCEDURE — A9575 INJ GADOTERATE MEGLUMI 0.1ML: HCPCS | Performed by: OTOLARYNGOLOGY

## 2024-04-25 PROCEDURE — 2550000001 HC RX 255 CONTRASTS: Performed by: OTOLARYNGOLOGY

## 2024-04-25 PROCEDURE — 70553 MRI BRAIN STEM W/O & W/DYE: CPT | Performed by: RADIOLOGY

## 2024-04-25 PROCEDURE — 70553 MRI BRAIN STEM W/O & W/DYE: CPT

## 2024-04-25 RX ORDER — GADOTERATE MEGLUMINE 376.9 MG/ML
17 INJECTION INTRAVENOUS
Status: COMPLETED | OUTPATIENT
Start: 2024-04-25 | End: 2024-04-25

## 2024-04-25 RX ADMIN — GADOTERATE MEGLUMINE 17 ML: 376.9 INJECTION INTRAVENOUS at 14:49

## 2024-04-27 ENCOUNTER — OFFICE VISIT (OUTPATIENT)
Dept: OTOLARYNGOLOGY | Facility: CLINIC | Age: 69
End: 2024-04-27
Payer: MEDICARE

## 2024-04-27 VITALS — HEIGHT: 67 IN | BODY MASS INDEX: 28.56 KG/M2 | WEIGHT: 182 LBS

## 2024-04-27 DIAGNOSIS — H69.93 DYSFUNCTION OF BOTH EUSTACHIAN TUBES: ICD-10-CM

## 2024-04-27 DIAGNOSIS — H90.A21 SENSORINEURAL HEARING LOSS (SNHL) OF RIGHT EAR WITH RESTRICTED HEARING OF LEFT EAR: ICD-10-CM

## 2024-04-27 DIAGNOSIS — H90.3 ASNHL (ASYMMETRICAL SENSORINEURAL HEARING LOSS): ICD-10-CM

## 2024-04-27 DIAGNOSIS — H93.13 TINNITUS OF BOTH EARS: ICD-10-CM

## 2024-04-27 DIAGNOSIS — J30.89 NON-SEASONAL ALLERGIC RHINITIS DUE TO OTHER ALLERGIC TRIGGER: Primary | ICD-10-CM

## 2024-04-27 DIAGNOSIS — D37.09 NEOPLASM OF UNCERTAIN BEHAVIOR OF UVULA: ICD-10-CM

## 2024-04-27 PROCEDURE — 1159F MED LIST DOCD IN RCRD: CPT | Performed by: OTOLARYNGOLOGY

## 2024-04-27 PROCEDURE — 1160F RVW MEDS BY RX/DR IN RCRD: CPT | Performed by: OTOLARYNGOLOGY

## 2024-04-27 PROCEDURE — 92511 NASOPHARYNGOSCOPY: CPT | Performed by: OTOLARYNGOLOGY

## 2024-04-27 PROCEDURE — 99214 OFFICE O/P EST MOD 30 MIN: CPT | Performed by: OTOLARYNGOLOGY

## 2024-04-27 RX ORDER — FLUTICASONE PROPIONATE 50 MCG
2 SPRAY, SUSPENSION (ML) NASAL DAILY
Qty: 48 ML | Refills: 1 | Status: SHIPPED | OUTPATIENT
Start: 2024-04-27

## 2024-04-27 RX ORDER — AZELASTINE 1 MG/ML
2 SPRAY, METERED NASAL 2 TIMES DAILY
Qty: 90 ML | Refills: 1 | Status: SHIPPED | OUTPATIENT
Start: 2024-04-27

## 2024-04-27 NOTE — PROGRESS NOTES
Subjective   Patient ID: Hanna Gee is a 68 y.o. female  HPI  Patient presents for follow-up status post excision of a lip lesion and a uvular lesion.  She also has a history of bilateral sensorineural hearing loss with asymmetry on the right side.  She is complaining of some congestion and fullness in the ears bilaterally.  She has no otalgia and no otorrhea.  Review of Systems    Objective   Physical Exam  There is nasal edema and the turbinates are pale.  The tympanic membranes are retracted and they are moving with a Valsalva maneuver.  The MRI of the brain was reviewed and there is evidence of effusion in the mastoids noted bilaterally.  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  Fiberoptic nasopharyngoscopy was offered in light of the eustachian tube dysfunction.  The nasal cavity and nasopharynx and hypopharynx were noted to be clear.  The eustachian tube folds are noted to be normal.    Nasopharyngoscopy    Date/Time: 4/27/2024 10:21 AM    Performed by: Missy Castillo MD  Authorized by: Missy Castillo MD    Consent:     Consent obtained:  Verbal  Procedure details:     Meds administered: Lidocaine and Afrin sprays.    Instrument: flexible fiberoptic nasal endoscope      Scope location: bilateral nare    Comments:      After informed consent was obtained. The nasal cavity was decongested and anesthesized with topical pontocaine and oxymetazoline hydrochloride spray. The fiberoptic scope was then introduced through both nostrils and the nasal septum, turbinates, nasopharynx and eustachian tube folds and opening were inspected.      Assessment/Plan   Diagnoses and all orders for this visit:  Non-seasonal allergic rhinitis due to other allergic trigger (Primary)  -     fluticasone (Flonase) 50 mcg/actuation nasal spray; Administer 2 sprays into each nostril once daily. Shake gently. Before first use, prime pump. After use,  clean tip and replace cap.  -     azelastine (Astelin) 137 mcg (0.1 %) nasal spray; Administer 2 sprays into each nostril 2 times a day.  Neoplasm of uncertain behavior of uvula  Dysfunction of both eustachian tubes  -     Nasopharyngoscopy  -     Tympanometry Only; Future  -     Comprehensive hearing test; Future  Sensorineural hearing loss (SNHL) of right ear with restricted hearing of left ear  -     Tympanometry Only; Future  -     Comprehensive hearing test; Future  ASNHL (asymmetrical sensorineural hearing loss)  -     Tympanometry Only; Future  -     Comprehensive hearing test; Future  Tinnitus of both ears     1.  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  2.  Chronic allergic rhinitis leading to bilateral eustachian tube dysfunction and evidence of effusions noted on the MRI of the brain and IACs.  The patient was started on Flonase and Astelin nasal sprays and she was advised to perform the Valsalva maneuver on a daily basis.  Nasopharyngoscopy was clear today.  3.  Chronic bilateral sensorineural hearing loss with asymmetry on the right side with otherwise normal MRI of the brain IACs.  The patient was medically cleared for the use of hearing aids and she will follow-up in 6 months with a repeat hearing test.

## 2024-05-24 ENCOUNTER — OFFICE VISIT (OUTPATIENT)
Dept: PRIMARY CARE | Facility: CLINIC | Age: 69
End: 2024-05-24
Payer: MEDICARE

## 2024-05-24 VITALS
WEIGHT: 182 LBS | SYSTOLIC BLOOD PRESSURE: 124 MMHG | BODY MASS INDEX: 28.56 KG/M2 | RESPIRATION RATE: 17 BRPM | HEART RATE: 88 BPM | DIASTOLIC BLOOD PRESSURE: 62 MMHG | HEIGHT: 67 IN | OXYGEN SATURATION: 97 %

## 2024-05-24 DIAGNOSIS — H93.13 TINNITUS OF BOTH EARS: ICD-10-CM

## 2024-05-24 DIAGNOSIS — K21.9 GASTROESOPHAGEAL REFLUX DISEASE WITHOUT ESOPHAGITIS: ICD-10-CM

## 2024-05-24 DIAGNOSIS — M51.16 LUMBAR DISC DISEASE WITH RADICULOPATHY: ICD-10-CM

## 2024-05-24 DIAGNOSIS — H70.93 MASTOIDITIS OF BOTH SIDES: Primary | ICD-10-CM

## 2024-05-24 DIAGNOSIS — R53.83 OTHER FATIGUE: ICD-10-CM

## 2024-05-24 DIAGNOSIS — E78.00 HYPERCHOLESTEROLEMIA: ICD-10-CM

## 2024-05-24 PROBLEM — H61.23 BILATERAL IMPACTED CERUMEN: Status: ACTIVE | Noted: 2024-05-24

## 2024-05-24 PROBLEM — H91.93 BILATERAL HEARING LOSS: Status: ACTIVE | Noted: 2024-05-24

## 2024-05-24 PROCEDURE — 1159F MED LIST DOCD IN RCRD: CPT | Performed by: INTERNAL MEDICINE

## 2024-05-24 PROCEDURE — 1160F RVW MEDS BY RX/DR IN RCRD: CPT | Performed by: INTERNAL MEDICINE

## 2024-05-24 PROCEDURE — 99214 OFFICE O/P EST MOD 30 MIN: CPT | Performed by: INTERNAL MEDICINE

## 2024-05-24 RX ORDER — PANTOPRAZOLE SODIUM 40 MG/1
40 TABLET, DELAYED RELEASE ORAL DAILY
Qty: 30 TABLET | Refills: 1 | Status: SHIPPED | OUTPATIENT
Start: 2024-05-24 | End: 2024-07-23

## 2024-05-24 RX ORDER — DOXYCYCLINE 100 MG/1
100 CAPSULE ORAL 2 TIMES DAILY
Qty: 28 CAPSULE | Refills: 0 | Status: SHIPPED | OUTPATIENT
Start: 2024-05-24 | End: 2024-06-07

## 2024-05-24 ASSESSMENT — ENCOUNTER SYMPTOMS
CARDIOVASCULAR NEGATIVE: 1
CONSTITUTIONAL NEGATIVE: 1
ALLERGIC/IMMUNOLOGIC NEGATIVE: 1
NEUROLOGICAL NEGATIVE: 1
PSYCHIATRIC NEGATIVE: 1
MUSCULOSKELETAL NEGATIVE: 1
EYES NEGATIVE: 1
RESPIRATORY NEGATIVE: 1
GASTROINTESTINAL NEGATIVE: 1
HEMATOLOGIC/LYMPHATIC NEGATIVE: 1
ENDOCRINE NEGATIVE: 1

## 2024-05-24 NOTE — PROGRESS NOTES
"Subjective   Patient ID: Hanna Gee is a 68 y.o. female who presents for Follow-up (Follow up/Red spot on torso under left breast ).    HPI     Review of Systems   Constitutional: Negative.    HENT: Negative.     Eyes: Negative.    Respiratory: Negative.     Cardiovascular: Negative.    Gastrointestinal: Negative.    Endocrine: Negative.    Musculoskeletal: Negative.    Skin: Negative.    Allergic/Immunologic: Negative.    Neurological: Negative.    Hematological: Negative.    Psychiatric/Behavioral: Negative.     All other systems reviewed and are negative.      Objective   Pulse 88   Resp 17   Ht 1.702 m (5' 7\")   Wt 82.6 kg (182 lb)   SpO2 97%   BMI 28.51 kg/m²   Blood pressure 124/62, pulse 88, resp. rate 17, height 1.702 m (5' 7\"), weight 82.6 kg (182 lb), SpO2 97%.   Physical Exam  Vitals and nursing note reviewed.   Constitutional:       Appearance: Normal appearance.   HENT:      Head: Normocephalic and atraumatic.      Right Ear: Tympanic membrane, ear canal and external ear normal.      Left Ear: Tympanic membrane, ear canal and external ear normal. There is no impacted cerumen.      Nose: Nose normal.      Mouth/Throat:      Mouth: Mucous membranes are moist.      Pharynx: Oropharynx is clear.   Eyes:      Extraocular Movements: Extraocular movements intact.      Conjunctiva/sclera: Conjunctivae normal.      Pupils: Pupils are equal, round, and reactive to light.   Cardiovascular:      Rate and Rhythm: Normal rate and regular rhythm.      Pulses: Normal pulses.      Heart sounds: Normal heart sounds. No murmur heard.  Pulmonary:      Effort: Pulmonary effort is normal. No respiratory distress.      Breath sounds: Normal breath sounds. No stridor. No wheezing, rhonchi or rales.   Chest:      Chest wall: No tenderness.   Abdominal:      General: Abdomen is flat. Bowel sounds are normal. There is no distension.      Palpations: Abdomen is soft. There is no mass.      Tenderness: There is no abdominal " tenderness. There is no right CVA tenderness, left CVA tenderness, guarding or rebound.      Hernia: No hernia is present.   Musculoskeletal:         General: Normal range of motion.      Cervical back: Normal range of motion and neck supple.   Skin:     General: Skin is warm.      Capillary Refill: Capillary refill takes less than 2 seconds.   Neurological:      General: No focal deficit present.      Mental Status: She is alert.      Cranial Nerves: No cranial nerve deficit.      Sensory: No sensory deficit.      Motor: No weakness.      Coordination: Coordination normal.      Gait: Gait normal.      Deep Tendon Reflexes: Reflexes normal.   Psychiatric:         Mood and Affect: Mood normal.         Behavior: Behavior normal. Behavior is cooperative.         Thought Content: Thought content normal.         Judgment: Judgment normal.         Assessment/Plan   Problem List Items Addressed This Visit             ICD-10-CM    Acid reflux - Primary K21.9     GERD - Acid reflux disease. Rx. PPI (Prilosec/Prevacid/Protonix/Nexium) and educate diet and life style changes. Referred patient to an endoscopy (EGD) and check H. Pylori titers.          Relevant Medications    pantoprazole (ProtoNix) 40 mg EC tablet    Lumbar disc disease with radiculopathy M51.16     DDD - Degenerative disc disease of the Lumbo-Sacral (LS)/Cervical (C)  spine. Educate exercises and referred patient to Physical Therapy (PT). Ordered X-Ray's of the LS/C spine. Consider MRI. Radiculopathy in the distribution of L4-L5-S1/C3-C4-C5 nerve roots, needs NSAIDS (Naprosin 500mg BID vs. Arthrotec 75mg BID or Prednisone taper (Medrol dose pack), Flexeril 10 mg qHS, heat application, and pain control with Tylenol vs. Vicodin 5/325 mg TID.           Tinnitus of both ears H93.13     Refer to ENT and start low dose Spironolactone 25 mg daily and monitor and start Flonase I BID and Allegra 180 mg daily Reviewed with the patient the  the MRI          Fatigue  R53.83     Fatigue - check CMP(metabolic panel and elctrolytes) , CBC(complete blood cell count), TSH(thyroid function). Insomnia may play a role and sleep studies(rule out sleep apnea) are recommended . Educate sleep hygiene. Consider anxiety disorder vs. depression. Consider Stress test, and 2DECHO.           Hypercholesterolemia E78.00     Hypercholesterolemia - Monitor lipid profile and educate patient upon risks of high cholesterol and targets. Educate diet and change in lifestyle and increase in exercises - Refill:   and educate compliance with medication and diet.            Other Visit Diagnoses         Codes    Mastoiditis of both sides     H70.93    Relevant Medications    doxycycline (Vibramycin) 100 mg capsule            Acid reflux K21.9         GERD - Acid reflux disease. Rx. PPI (Prilosec/Prevacid/Protonix/Nexium) and educate diet and life style changes. Referred patient to an endoscopy (EGD) and check H. Pylori titers.            Asymptomatic cholelithiasis K80.20       Asymptomatic  now and we will follow closely and consider and advise cholecystectomy and educate risks of gall bladder disease and calculus            Cervical disc disease - Primary M50.90       DDD - Degenerative disc disease of the )/Cervical (C)  spine. Educate exercises and referred patient to Physical Therapy (PT). Ordered X-Ray's of the /C spine. Consider MRI. Radiculopathy in the distribution of C4-C5-C6 nerve roots, needs NSAIDS (Naprosin 500mg BID vs. Arthrotec 75mg BID or Prednisone taper (Medrol dose pack), Flexeril 10 mg qHS, heat application, and pain control with Tylenol 325 mg TID.             Lumbar disc disease with radiculopathy M51.16       DDD - Degenerative disc disease of the Lumbo-Sacral (LS)/Cervical (C)  spine. Educate exercises and referred patient to Physical Therapy (PT). Ordered X-Ray's of the LS/C spine. Consider MRI. Radiculopathy in the distribution of L4-L5-S1/C3-C4-C5 nerve roots, needs NSAIDS  (Naprosin 500mg BID vs. Arthrotec 75mg BID or Prednisone taper (Medrol dose pack), Flexeril 10 mg qHS, heat application, and pain control with Tylenol 325 mg TID.               Muscle weakness (generalized) M62.81       Kne pains eand .reccomend exercises and strengthening exercises and .Xrays and refer to physical therapy and pain control             Nicotine dependence F17.200       Tobacco cessation - Educate risks of smoking (CAD/COPD/CANCER of the lung or urinary bladder/CVA). Educate life style changes and prescribe nicotine patch and Wellbutrin 150mg BID. Educate stress reduction.                                                                                        Obstructive sleep apnea G47.33       Sleep apnea/nocturnal hypoxia - Not/ using the CPAP consider repeat sleep studies and order a new CPAP - The patient is complaining of day-time sleepiness(falling asleep easily/  narcolepsy) while driving or sitting still. Also patient complains of major tiredness/ fatigue, multiple episodes of night time awakenings(unexpalined). Also patient is at high risk for sleep apnea: overweight, small throat opening and enlarged (kissing) tonsils, sedentary lifestyle, sleeping aides. Recommend: Sleep studies: Nocturnal oxymetry, sleep lab. Consider CPAP vs. Oxygen per Nasal Canula at night at 2L/min. for nocturnal hypoxia            Umbilical hernia without obstruction and without gangrene K42.9       Asymptomatic  now  - educate to avoid strain and recommend repair             Other Visit Diagnoses           Codes     Flu vaccine need     Z23     Relevant Orders     Flu vaccine, quadrivalent, high-dose, preservative free, age 65y+ (FLUZONE)                             Immunizations/Injections       very important  Immunizations from outside sources need reconciliation.       Flu vaccine, quadrivalent, high-dose, preservative free, age 65y+ (FLUZONE)12/1/2023  Pfizer COVID-19 vaccine, Fall 2023, 12 years and older,  (30mcg/0.3mL)11/10/2023  Pfizer COVID-19 vaccine, bivalent, age 12 years and older (30 mcg/0.3 mL)10/26/2022  Pfizer Purple Cap SARS-CoV-21/2/2022, 4/4/2021, 3/13/2021                   Immunizations/Injections       very important  Immunizations from outside sources need reconciliation.       Flu vaccine, quadrivalent, high-dose, preservative free, age 65y+ (FLUZONE)12/1/2023  Pfizer COVID-19 vaccine, Fall 2023, 12 years and older, (30mcg/0.3mL)11/10/2023  Pfizer COVID-19 vaccine, bivalent, age 12 years and older (30 mcg/0.3 mL)10/26/2022  Pfizer Purple Cap SARS-CoV-21/2/2022, 4/4/2021, 3/13/2021  Pneumococcal conjugate vaccine, 20-valent (PREVNAR 20)5/5/2023                Immunizations/Injections      very important  Immunizations from outside sources need reconciliation.      Flu vaccine, quadrivalent, high-dose, preservative free, age 65y+ (FLUZONE)12/1/2023  Pfizer COVID-19 vaccine, Fall 2023, 12 years and older, (30mcg/0.3mL)11/10/2023  Pfizer COVID-19 vaccine, bivalent, age 12 years and older (30 mcg/0.3 mL)10/26/2022  Pfizer Purple Cap SARS-CoV-21/2/2022, 4/4/2021, 3/13/2021  Pneumococcal conjugate vaccine, 20-valent (PREVNAR 20)5/5/2023

## 2024-05-24 NOTE — ASSESSMENT & PLAN NOTE
Refer to ENT and start low dose Spironolactone 25 mg daily and monitor and start Flonase I BID and Allegra 180 mg daily Reviewed with the patient the  the MRI

## 2024-08-23 ENCOUNTER — APPOINTMENT (OUTPATIENT)
Dept: PRIMARY CARE | Facility: CLINIC | Age: 69
End: 2024-08-23
Payer: MEDICARE

## 2024-08-23 VITALS
SYSTOLIC BLOOD PRESSURE: 135 MMHG | WEIGHT: 182 LBS | HEART RATE: 88 BPM | RESPIRATION RATE: 17 BRPM | OXYGEN SATURATION: 97 % | DIASTOLIC BLOOD PRESSURE: 90 MMHG | BODY MASS INDEX: 28.56 KG/M2 | HEIGHT: 67 IN

## 2024-08-23 DIAGNOSIS — R53.83 OTHER FATIGUE: ICD-10-CM

## 2024-08-23 DIAGNOSIS — R51.9 ACHING HEADACHE: ICD-10-CM

## 2024-08-23 DIAGNOSIS — F17.211 TOBACCO DEPENDENCE DUE TO CIGARETTES, IN REMISSION: ICD-10-CM

## 2024-08-23 DIAGNOSIS — M50.90 CERVICAL DISC DISEASE: ICD-10-CM

## 2024-08-23 DIAGNOSIS — K21.9 GASTROESOPHAGEAL REFLUX DISEASE WITHOUT ESOPHAGITIS: ICD-10-CM

## 2024-08-23 DIAGNOSIS — M51.16 LUMBAR DISC DISEASE WITH RADICULOPATHY: Primary | ICD-10-CM

## 2024-08-23 DIAGNOSIS — Z72.0 TOBACCO USE: ICD-10-CM

## 2024-08-23 DIAGNOSIS — E78.00 HYPERCHOLESTEROLEMIA: ICD-10-CM

## 2024-08-23 PROCEDURE — 1159F MED LIST DOCD IN RCRD: CPT | Performed by: INTERNAL MEDICINE

## 2024-08-23 PROCEDURE — 3008F BODY MASS INDEX DOCD: CPT | Performed by: INTERNAL MEDICINE

## 2024-08-23 PROCEDURE — 99214 OFFICE O/P EST MOD 30 MIN: CPT | Performed by: INTERNAL MEDICINE

## 2024-08-23 ASSESSMENT — ENCOUNTER SYMPTOMS
MUSCULOSKELETAL NEGATIVE: 1
HEMATOLOGIC/LYMPHATIC NEGATIVE: 1
RESPIRATORY NEGATIVE: 1
CONSTITUTIONAL NEGATIVE: 1
ALLERGIC/IMMUNOLOGIC NEGATIVE: 1
EYES NEGATIVE: 1
ENDOCRINE NEGATIVE: 1
CARDIOVASCULAR NEGATIVE: 1
NEUROLOGICAL NEGATIVE: 1
PSYCHIATRIC NEGATIVE: 1
GASTROINTESTINAL NEGATIVE: 1

## 2024-08-23 NOTE — ASSESSMENT & PLAN NOTE
DDD - Degenerative disc disease of the )/Cervical (C) spine. Educate exercises and referred patient to Physical Therapy (PT). Ordered X-Ray's of the /C spine. Consider MRI. Radiculopathy in the distribution of C4-C5-C6 nerve roots, needs NSAIDS (Naprosin 500mg BID vs. Arthrotec 75mg BID or Prednisone taper (Medrol dose pack), Flexeril 10 mg qHS, heat application, and pain control with Tylenol 325 mg TID.

## 2024-08-23 NOTE — ASSESSMENT & PLAN NOTE
DDD - Degenerative disc disease of the Lumbo-Sacral (LS)/Cervical (C) spine. Educate exercises and referred patient to Physical Therapy (PT). Ordered X-Ray's of the LS/C spine. Consider MRI. Radiculopathy in the distribution of L4-L5-S1/C3-C4-C5 nerve roots, needs NSAIDS (Naprosin 500mg BID vs. Arthrotec 75mg BID or Prednisone taper (Medrol dose pack), Flexeril 10 mg qHS, heat application, and pain control with Tylenol vs. Vicodin 5/325 mg TID.

## 2024-08-23 NOTE — PROGRESS NOTES
"Subjective   Patient ID: Hanna Gee is a 69 y.o. female who presents for Follow-up (Follow up). Slight congestion and head ache and denies fever or chills     HPI     Review of Systems   Constitutional: Negative.    HENT: Negative.     Eyes: Negative.    Respiratory: Negative.     Cardiovascular: Negative.    Gastrointestinal: Negative.    Endocrine: Negative.    Musculoskeletal: Negative.    Skin: Negative.    Allergic/Immunologic: Negative.    Neurological: Negative.    Hematological: Negative.    Psychiatric/Behavioral: Negative.     All other systems reviewed and are negative.      Objective   Pulse 88   Resp 17   Ht 1.702 m (5' 7\")   Wt 82.6 kg (182 lb)   SpO2 97%   BMI 28.51 kg/m²   Blood pressure 135/90, pulse 88, resp. rate 17, height 1.702 m (5' 7\"), weight 82.6 kg (182 lb), SpO2 97%.   Physical Exam  Vitals and nursing note reviewed.   Constitutional:       Appearance: Normal appearance.   HENT:      Head: Normocephalic and atraumatic.      Right Ear: Tympanic membrane, ear canal and external ear normal.      Left Ear: Tympanic membrane, ear canal and external ear normal. There is no impacted cerumen.      Nose: Nose normal.      Mouth/Throat:      Mouth: Mucous membranes are moist.      Pharynx: Oropharynx is clear.   Eyes:      Extraocular Movements: Extraocular movements intact.      Conjunctiva/sclera: Conjunctivae normal.      Pupils: Pupils are equal, round, and reactive to light.   Cardiovascular:      Rate and Rhythm: Normal rate and regular rhythm.      Pulses: Normal pulses.      Heart sounds: Normal heart sounds. No murmur heard.  Pulmonary:      Effort: Pulmonary effort is normal. No respiratory distress.      Breath sounds: Normal breath sounds. No stridor. No wheezing, rhonchi or rales.   Chest:      Chest wall: No tenderness.   Abdominal:      General: Abdomen is flat. Bowel sounds are normal. There is no distension.      Palpations: Abdomen is soft. There is no mass.      Tenderness: " There is no abdominal tenderness. There is no right CVA tenderness, left CVA tenderness, guarding or rebound.      Hernia: No hernia is present.   Musculoskeletal:         General: Normal range of motion.      Cervical back: Normal range of motion and neck supple.   Skin:     General: Skin is warm.      Capillary Refill: Capillary refill takes less than 2 seconds.   Neurological:      General: No focal deficit present.      Mental Status: She is alert.      Cranial Nerves: No cranial nerve deficit.      Sensory: No sensory deficit.      Motor: No weakness.      Coordination: Coordination normal.      Gait: Gait normal.      Deep Tendon Reflexes: Reflexes normal.   Psychiatric:         Mood and Affect: Mood normal.         Behavior: Behavior normal. Behavior is cooperative.         Thought Content: Thought content normal.         Judgment: Judgment normal.         Assessment/Plan   Problem List Items Addressed This Visit             ICD-10-CM    Acid reflux K21.9     GERD - Acid reflux disease. Rx. PPI (Prilosec/Prevacid/Protonix/Nexium) and educate diet and life style changes. Referred patient to an endoscopy (EGD) and check H. Pylori titers.          Cervical disc disease M50.90     DDD - Degenerative disc disease of the )/Cervical (C) spine. Educate exercises and referred patient to Physical Therapy (PT). Ordered X-Ray's of the /C spine. Consider MRI. Radiculopathy in the distribution of C4-C5-C6 nerve roots, needs NSAIDS (Naprosin 500mg BID vs. Arthrotec 75mg BID or Prednisone taper (Medrol dose pack), Flexeril 10 mg qHS, heat application, and pain control with Tylenol 325 mg TID.          Lumbar disc disease with radiculopathy - Primary M51.16     DDD - Degenerative disc disease of the Lumbo-Sacral (LS)/Cervical (C) spine. Educate exercises and referred patient to Physical Therapy (PT). Ordered X-Ray's of the LS/C spine. Consider MRI. Radiculopathy in the distribution of L4-L5-S1/C3-C4-C5 nerve roots, needs  NSAIDS (Naprosin 500mg BID vs. Arthrotec 75mg BID or Prednisone taper (Medrol dose pack), Flexeril 10 mg qHS, heat application, and pain control with Tylenol vs. Vicodin 5/325 mg TID.          Fatigue R53.83     Fatigue - check CMP(metabolic panel and elctrolytes) , CBC(complete blood cell count), TSH(thyroid function). Insomnia may play a role and sleep studies(rule out sleep apnea) are recommended . Educate sleep hygiene. Consider anxiety disorder vs. depression. Consider Stress test, and 2DECHO.           Hypercholesterolemia E78.00     Hypercholesterolemia - Monitor lipid profile and educate patient upon risks of high cholesterol and targets. Educate diet and change in lifestyle and increase in exercises - Refill:   and educate compliance with medication and diet.           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 Rx. PPI (Prilosec/Prevacid/Protonix/Nexium) and educate diet and life style changes. Referred patient to an endoscopy (EGD) and check H. Pylori titers.            Relevant Medications     pantoprazole (ProtoNix) 40 mg EC tablet     Lumbar disc disease with radiculopathy M51.16       DDD - Degenerative disc disease of the Lumbo-Sacral (LS)/Cervical (C)  spine. Educate exercises and referred patient to Physical Therapy (PT). Ordered X-Ray's of the LS/C spine. Consider MRI. Radiculopathy in the distribution of L4-L5-S1/C3-C4-C5 nerve roots, needs NSAIDS (Naprosin 500mg BID vs. Arthrotec 75mg BID or Prednisone taper (Medrol dose pack), Flexeril 10 mg qHS, heat application, and pain control with Tylenol vs. Vicodin  5/325 mg TID.             Tinnitus of both ears H93.13       Refer to ENT and start low dose Spironolactone 25 mg daily and monitor and start Flonase I BID and Allegra 180 mg daily Reviewed with the patient the  the MRI            Fatigue R53.83       Fatigue - check CMP(metabolic panel and elctrolytes) , CBC(complete blood cell count), TSH(thyroid function). Insomnia may play a role and sleep studies(rule out sleep apnea) are recommended . Educate sleep hygiene. Consider anxiety disorder vs. depression. Consider Stress test, and 2DECHO.             Hypercholesterolemia E78.00       Hypercholesterolemia - Monitor lipid profile and educate patient upon risks of high cholesterol and targets. Educate diet and change in lifestyle and increase in exercises - Refill:   and educate compliance with medication and diet.              Other Visit Diagnoses           Codes     Mastoiditis of both sides     H70.93     Relevant Medications     doxycycline (Vibramycin) 100 mg capsule                       Acid reflux K21.9          GERD - Acid reflux disease. Rx. PPI (Prilosec/Prevacid/Protonix/Nexium) and educate diet and life style changes. Referred patient to an endoscopy (EGD) and check H. Pylori titers.            Asymptomatic cholelithiasis K80.20        Asymptomatic  now and we will follow closely and consider and advise cholecystectomy and educate risks of gall bladder disease and calculus            Cervical disc disease - Primary M50.90        DDD - Degenerative disc disease of the )/Cervical (C)  spine. Educate exercises and referred patient to Physical Therapy (PT). Ordered X-Ray's of the /C spine. Consider MRI. Radiculopathy in the distribution of C4-C5-C6 nerve roots, needs NSAIDS (Naprosin 500mg BID vs. Arthrotec 75mg BID or Prednisone taper (Medrol dose pack), Flexeril 10 mg qHS, heat application, and pain control with Tylenol 325 mg TID.             Lumbar disc disease with radiculopathy M51.16        DDD -  Degenerative disc disease of the Lumbo-Sacral (LS)/Cervical (C)  spine. Educate exercises and referred patient to Physical Therapy (PT). Ordered X-Ray's of the LS/C spine. Consider MRI. Radiculopathy in the distribution of L4-L5-S1/C3-C4-C5 nerve roots, needs NSAIDS (Naprosin 500mg BID vs. Arthrotec 75mg BID or Prednisone taper (Medrol dose pack), Flexeril 10 mg qHS, heat application, and pain control with Tylenol 325 mg TID.               Muscle weakness (generalized) M62.81        Kne pains eand .reccomend exercises and strengthening exercises and .Xrays and refer to physical therapy and pain control             Nicotine dependence F17.200        Tobacco cessation - Educate risks of smoking (CAD/COPD/CANCER of the lung or urinary bladder/CVA). Educate life style changes and prescribe nicotine patch and Wellbutrin 150mg BID. Educate stress reduction.                                                                                        Obstructive sleep apnea G47.33        Sleep apnea/nocturnal hypoxia - Not/ using the CPAP consider repeat sleep studies and order a new CPAP - The patient is complaining of day-time sleepiness(falling asleep easily/  narcolepsy) while driving or sitting still. Also patient complains of major tiredness/ fatigue, multiple episodes of night time awakenings(unexpalined). Also patient is at high risk for sleep apnea: overweight, small throat opening and enlarged (kissing) tonsils, sedentary lifestyle, sleeping aides. Recommend: Sleep studies: Nocturnal oxymetry, sleep lab. Consider CPAP vs. Oxygen per Nasal Canula at night at 2L/min. for nocturnal hypoxia            Umbilical hernia without obstruction and without gangrene K42.9        Asymptomatic  now  - educate to avoid strain and recommend repair             Other Visit Diagnoses           Codes     Flu vaccine need     Z23     Relevant Orders     Flu vaccine, quadrivalent, high-dose, preservative free, age 65y+ (FLUZONE)                              Immunizations/Injections       very important  Immunizations from outside sources need reconciliation.       Flu vaccine, quadrivalent, high-dose, preservative free, age 65y+ (FLUZONE)12/1/2023  Pfizer COVID-19 vaccine, Fall 2023, 12 years and older, (30mcg/0.3mL)11/10/2023  Pfizer COVID-19 vaccine, bivalent, age 12 years and older (30 mcg/0.3 mL)10/26/2022  Pfizer Purple Cap SARS-CoV-21/2/2022, 4/4/2021, 3/13/2021                   Immunizations/Injections       very important  Immunizations from outside sources need reconciliation.       Flu vaccine, quadrivalent, high-dose, preservative free, age 65y+ (FLUZONE)12/1/2023  Pfizer COVID-19 vaccine, Fall 2023, 12 years and older, (30mcg/0.3mL)11/10/2023  Pfizer COVID-19 vaccine, bivalent, age 12 years and older (30 mcg/0.3 mL)10/26/2022  Pfizer Purple Cap SARS-CoV-21/2/2022, 4/4/2021, 3/13/2021  Pneumococcal conjugate vaccine, 20-valent (PREVNAR 20)5/5/2023                  Immunizations/Injections       very important  Immunizations from outside sources need reconciliation.       Flu vaccine, quadrivalent, high-dose, preservative free, age 65y+ (FLUZONE)12/1/2023  Pfizer COVID-19 vaccine, Fall 2023, 12 years and older, (30mcg/0.3mL)11/10/2023  Pfizer COVID-19 vaccine, bivalent, age 12 years and older (30 mcg/0.3 mL)10/26/2022  Pfizer Purple Cap SARS-CoV-21/2/2022, 4/4/2021, 3/13/2021  Pneumococcal conjugate vaccine, 20-valent (PREVNAR 20)5/5/2023                 Immunizations/Injections      very important  Immunizations from outside sources need reconciliation.      Flu vaccine, quadrivalent, high-dose, preservative free, age 65y+ (FLUZONE)12/1/2023  Pfizer COVID-19 vaccine, Fall 2023, 12 years and older, (30mcg/0.3mL)11/10/2023  Pfizer COVID-19 vaccine, bivalent, age 12 years and older (30 mcg/0.3 mL)10/26/2022  Pfizer Purple Cap SARS-CoV-21/2/2022, 4/4/2021, 3/13/2021  Pneumococcal conjugate vaccine, 20-valent (PREVNAR 20)5/5/2023

## 2024-08-23 NOTE — ASSESSMENT & PLAN NOTE
Occipital head ache and improved now but still mild tension observe for now and no associated fever

## 2024-09-09 ENCOUNTER — HOSPITAL ENCOUNTER (OUTPATIENT)
Dept: RADIOLOGY | Facility: CLINIC | Age: 69
Discharge: HOME | End: 2024-09-09
Payer: MEDICARE

## 2024-09-09 DIAGNOSIS — Z72.0 TOBACCO USE: ICD-10-CM

## 2024-09-09 DIAGNOSIS — F17.211 TOBACCO DEPENDENCE DUE TO CIGARETTES, IN REMISSION: ICD-10-CM

## 2024-09-09 PROCEDURE — 71271 CT THORAX LUNG CANCER SCR C-: CPT

## 2024-10-29 ENCOUNTER — APPOINTMENT (OUTPATIENT)
Dept: AUDIOLOGY | Facility: CLINIC | Age: 69
End: 2024-10-29
Payer: MEDICARE

## 2024-10-29 ENCOUNTER — APPOINTMENT (OUTPATIENT)
Dept: OTOLARYNGOLOGY | Facility: CLINIC | Age: 69
End: 2024-10-29
Payer: MEDICARE

## 2024-10-29 VITALS — HEIGHT: 67 IN | BODY MASS INDEX: 28.58 KG/M2 | WEIGHT: 182.1 LBS

## 2024-10-29 DIAGNOSIS — J30.89 NON-SEASONAL ALLERGIC RHINITIS DUE TO OTHER ALLERGIC TRIGGER: Primary | ICD-10-CM

## 2024-10-29 DIAGNOSIS — H90.3 ASNHL (ASYMMETRICAL SENSORINEURAL HEARING LOSS): ICD-10-CM

## 2024-10-29 DIAGNOSIS — H90.3 BILATERAL SENSORINEURAL HEARING LOSS: Primary | ICD-10-CM

## 2024-10-29 DIAGNOSIS — H69.93 DYSFUNCTION OF BOTH EUSTACHIAN TUBES: ICD-10-CM

## 2024-10-29 DIAGNOSIS — H93.13 TINNITUS OF BOTH EARS: ICD-10-CM

## 2024-10-29 DIAGNOSIS — H90.A21 SENSORINEURAL HEARING LOSS (SNHL) OF RIGHT EAR WITH RESTRICTED HEARING OF LEFT EAR: ICD-10-CM

## 2024-10-29 PROCEDURE — 1159F MED LIST DOCD IN RCRD: CPT | Performed by: OTOLARYNGOLOGY

## 2024-10-29 PROCEDURE — 92552 PURE TONE AUDIOMETRY AIR: CPT

## 2024-10-29 PROCEDURE — 3008F BODY MASS INDEX DOCD: CPT | Performed by: OTOLARYNGOLOGY

## 2024-10-29 PROCEDURE — 99213 OFFICE O/P EST LOW 20 MIN: CPT | Performed by: OTOLARYNGOLOGY

## 2024-10-29 PROCEDURE — 1160F RVW MEDS BY RX/DR IN RCRD: CPT | Performed by: OTOLARYNGOLOGY

## 2024-10-29 RX ORDER — FLUTICASONE PROPIONATE 50 MCG
2 SPRAY, SUSPENSION (ML) NASAL DAILY
Qty: 48 ML | Refills: 3 | Status: SHIPPED | OUTPATIENT
Start: 2024-10-29

## 2024-10-29 RX ORDER — AZELASTINE 1 MG/ML
2 SPRAY, METERED NASAL 2 TIMES DAILY
Qty: 90 ML | Refills: 3 | Status: SHIPPED | OUTPATIENT
Start: 2024-10-29

## 2024-11-22 ENCOUNTER — APPOINTMENT (OUTPATIENT)
Dept: PRIMARY CARE | Facility: CLINIC | Age: 69
End: 2024-11-22
Payer: MEDICARE

## 2024-11-22 VITALS
DIASTOLIC BLOOD PRESSURE: 75 MMHG | WEIGHT: 185 LBS | SYSTOLIC BLOOD PRESSURE: 130 MMHG | OXYGEN SATURATION: 96 % | HEART RATE: 88 BPM | HEIGHT: 67 IN | RESPIRATION RATE: 20 BRPM | BODY MASS INDEX: 29.03 KG/M2

## 2024-11-22 DIAGNOSIS — R53.83 OTHER FATIGUE: ICD-10-CM

## 2024-11-22 DIAGNOSIS — K42.9 UMBILICAL HERNIA WITHOUT OBSTRUCTION AND WITHOUT GANGRENE: ICD-10-CM

## 2024-11-22 DIAGNOSIS — M79.644 PAIN OF RIGHT MIDDLE FINGER: ICD-10-CM

## 2024-11-22 DIAGNOSIS — M51.16 LUMBAR DISC DISEASE WITH RADICULOPATHY: Primary | ICD-10-CM

## 2024-11-22 DIAGNOSIS — K21.9 GASTROESOPHAGEAL REFLUX DISEASE WITHOUT ESOPHAGITIS: ICD-10-CM

## 2024-11-22 DIAGNOSIS — E78.00 HYPERCHOLESTEROLEMIA: ICD-10-CM

## 2024-11-22 DIAGNOSIS — M62.81 MUSCLE WEAKNESS (GENERALIZED): ICD-10-CM

## 2024-11-22 PROCEDURE — 1159F MED LIST DOCD IN RCRD: CPT | Performed by: INTERNAL MEDICINE

## 2024-11-22 PROCEDURE — 3008F BODY MASS INDEX DOCD: CPT | Performed by: INTERNAL MEDICINE

## 2024-11-22 PROCEDURE — G2211 COMPLEX E/M VISIT ADD ON: HCPCS | Performed by: INTERNAL MEDICINE

## 2024-11-22 PROCEDURE — 99214 OFFICE O/P EST MOD 30 MIN: CPT | Performed by: INTERNAL MEDICINE

## 2024-11-22 ASSESSMENT — ENCOUNTER SYMPTOMS
CONSTITUTIONAL NEGATIVE: 1
EYES NEGATIVE: 1
ENDOCRINE NEGATIVE: 1
PSYCHIATRIC NEGATIVE: 1
GASTROINTESTINAL NEGATIVE: 1
HEMATOLOGIC/LYMPHATIC NEGATIVE: 1
RESPIRATORY NEGATIVE: 1
CARDIOVASCULAR NEGATIVE: 1
NEUROLOGICAL NEGATIVE: 1
MUSCULOSKELETAL NEGATIVE: 1
ALLERGIC/IMMUNOLOGIC NEGATIVE: 1

## 2024-11-22 NOTE — ASSESSMENT & PLAN NOTE
Hypercholesterolemia - Monitor lipid profile and educate patient upon risks of high cholesterol and targets. Educate diet and change in lifestyle and increase in exercises - Refill: and educate compliance with medication and diet.

## 2024-11-22 NOTE — ASSESSMENT & PLAN NOTE
DDD - Degenerative disc disease of the Lumbo-Sacral (LS)spine. Educate exercises and referred patient to Physical Therapy (PT). Ordered X-Ray's of the LS/spine. Consider MRI. Radiculopathy in the distribution of L4-L5-S1/nerve roots, needs NSAIDS (Naprosin 500mg BID vs. Arthrotec 75mg BID or Prednisone taper (Medrol dose pack), Flexeril 10 mg qHS, heat application, and pain control with Tylenol v

## 2024-11-22 NOTE — PROGRESS NOTES
"Subjective   Patient ID: Hanna Gee is a 69 y.o. female who presents for Follow-up (Follow up).    HPI     Review of Systems   Constitutional: Negative.    HENT: Negative.     Eyes: Negative.    Respiratory: Negative.     Cardiovascular: Negative.    Gastrointestinal: Negative.    Endocrine: Negative.    Musculoskeletal: Negative.    Skin: Negative.    Allergic/Immunologic: Negative.    Neurological: Negative.    Hematological: Negative.    Psychiatric/Behavioral: Negative.     All other systems reviewed and are negative.      Objective   Pulse 88   Resp 20   Ht 1.702 m (5' 7\")   Wt 83.9 kg (185 lb)   SpO2 96%   BMI 28.98 kg/m²   Blood pressure 130/75, pulse 88, resp. rate 20, height 1.702 m (5' 7\"), weight 83.9 kg (185 lb), SpO2 96%.   Physical Exam  Vitals and nursing note reviewed.   Constitutional:       Appearance: Normal appearance.   HENT:      Head: Normocephalic and atraumatic.      Right Ear: Tympanic membrane, ear canal and external ear normal.      Left Ear: Tympanic membrane, ear canal and external ear normal. There is no impacted cerumen.      Nose: Nose normal.      Mouth/Throat:      Mouth: Mucous membranes are moist.      Pharynx: Oropharynx is clear.   Eyes:      Extraocular Movements: Extraocular movements intact.      Conjunctiva/sclera: Conjunctivae normal.      Pupils: Pupils are equal, round, and reactive to light.   Cardiovascular:      Rate and Rhythm: Normal rate and regular rhythm.      Pulses: Normal pulses.      Heart sounds: Normal heart sounds. No murmur heard.  Pulmonary:      Effort: Pulmonary effort is normal. No respiratory distress.      Breath sounds: Normal breath sounds. No stridor. No wheezing, rhonchi or rales.   Chest:      Chest wall: No tenderness.   Abdominal:      General: Abdomen is flat. Bowel sounds are normal. There is no distension.      Palpations: Abdomen is soft. There is no mass.      Tenderness: There is no abdominal tenderness. There is no right CVA " tenderness, left CVA tenderness, guarding or rebound.      Hernia: No hernia is present.   Musculoskeletal:         General: Normal range of motion.      Cervical back: Normal range of motion and neck supple.   Skin:     General: Skin is warm.      Capillary Refill: Capillary refill takes less than 2 seconds.   Neurological:      General: No focal deficit present.      Mental Status: She is alert.      Cranial Nerves: No cranial nerve deficit.      Sensory: No sensory deficit.      Motor: No weakness.      Coordination: Coordination normal.      Gait: Gait normal.      Deep Tendon Reflexes: Reflexes normal.   Psychiatric:         Mood and Affect: Mood normal.         Behavior: Behavior normal. Behavior is cooperative.         Thought Content: Thought content normal.         Judgment: Judgment normal.       Assessment/Plan   Problem List Items Addressed This Visit             ICD-10-CM    Acid reflux K21.9     GERD - Acid reflux disease. Rx. PPI (Prilosec/Prevacid/Protonix/Nexium) and educate diet and life style changes. Referred patient to an endoscopy (EGD) and check H. Pylori titers.          Lumbar disc disease with radiculopathy - Primary M51.16     DDD - Degenerative disc disease of the Lumbo-Sacral (LS)spine. Educate exercises and referred patient to Physical Therapy (PT). Ordered X-Ray's of the LS/spine. Consider MRI. Radiculopathy in the distribution of L4-L5-S1/nerve roots, needs NSAIDS (Naprosin 500mg BID vs. Arthrotec 75mg BID or Prednisone taper (Medrol dose pack), Flexeril 10 mg qHS, heat application, and pain control with Tylenol v          Muscle weakness (generalized) M62.81     Knee pains eand .reccomend exercises and strengthening exercises and .Xrays and refer to physical therapy and pain control           Umbilical hernia without obstruction and without gangrene K42.9     Asymptomatic  now  - educate to avoid strain and recommend repair          Fatigue R53.83     Fatigue - check CMP(metabolic  panel and elctrolytes) , CBC(complete blood cell count), TSH(thyroid function). Insomnia may play a role and sleep studies(rule out sleep apnea) are recommended . Educate sleep hygiene. Consider anxiety disorder vs. depression. Consider Stress test, and 2DECHO.           Hypercholesterolemia E78.00     Hypercholesterolemia - Monitor lipid profile and educate patient upon risks of high cholesterol and targets. Educate diet and change in lifestyle and increase in exercises - Refill: and educate compliance with medication and diet.                Acid reflux K21.9        GERD - Acid reflux disease. Rx. PPI (Prilosec/Prevacid/Protonix/Nexium) and educate diet and life style changes. Referred patient to an endoscopy (EGD) and check H. Pylori titers.            Cervical disc disease M50.90       DDD - Degenerative disc disease of the )/Cervical (C) spine. Educate exercises and referred patient to Physical Therapy (PT). Ordered X-Ray's of the /C spine. Consider MRI. Radiculopathy in the distribution of C4-C5-C6 nerve roots, needs NSAIDS (Naprosin 500mg BID vs. Arthrotec 75mg BID or Prednisone taper (Medrol dose pack), Flexeril 10 mg qHS, heat application, and pain control with Tylenol 325 mg TID.            Lumbar disc disease with radiculopathy - Primary M51.16       DDD - Degenerative disc disease of the Lumbo-Sacral (LS)/Cervical (C) spine. Educate exercises and referred patient to Physical Therapy (PT). Ordered X-Ray's of the LS/C spine. Consider MRI. Radiculopathy in the distribution of L4-L5-S1/C3-C4-C5 nerve roots, needs NSAIDS (Naprosin 500mg BID vs. Arthrotec 75mg BID or Prednisone taper (Medrol dose pack), Flexeril 10 mg qHS, heat application, and pain control with Tylenol vs. Vicodin 5/325 mg TID.            Fatigue R53.83       Fatigue - check CMP(metabolic panel and elctrolytes) , CBC(complete blood cell count), TSH(thyroid function). Insomnia may play a role and sleep studies(rule out sleep apnea) are  recommended . Educate sleep hygiene. Consider anxiety disorder vs. depression. Consider Stress test, and 2DECHO.             Hypercholesterolemia E78.00       Hypercholesterolemia - Monitor lipid profile and educate patient upon risks of high cholesterol and targets. Educate diet and change in lifestyle and increase in exercises - Refill:   and educate compliance with medication and diet.             Aching headache R51.9       Occipital head ache and improved now but still mild tension observe for now and no associated fever             Other Visit Diagnoses           Codes     Tobacco use     Z72.0     Relevant Orders     CT lung screening low dose     Tobacco dependence due to cigarettes, in remission     F17.211     Relevant Orders     CT lung screening low dose                      Acid reflux - Primary K21.9         GERD - Acid reflux disease. Rx. PPI (Prilosec/Prevacid/Protonix/Nexium) and educate diet and life style changes. Referred patient to an endoscopy (EGD) and check H. Pylori titers.            Relevant Medications     pantoprazole (ProtoNix) 40 mg EC tablet     Lumbar disc disease with radiculopathy M51.16       DDD - Degenerative disc disease of the Lumbo-Sacral (LS)/Cervical (C)  spine. Educate exercises and referred patient to Physical Therapy (PT). Ordered X-Ray's of the LS/C spine. Consider MRI. Radiculopathy in the distribution of L4-L5-S1/C3-C4-C5 nerve roots, needs NSAIDS (Naprosin 500mg BID vs. Arthrotec 75mg BID or Prednisone taper (Medrol dose pack), Flexeril 10 mg qHS, heat application, and pain control with Tylenol vs. Vicodin 5/325 mg TID.             Tinnitus of both ears H93.13       Refer to ENT and start low dose Spironolactone 25 mg daily and monitor and start Flonase I BID and Allegra 180 mg daily Reviewed with the patient the  the MRI            Fatigue R53.83       Fatigue - check CMP(metabolic panel and elctrolytes) , CBC(complete blood cell count), TSH(thyroid function).  Insomnia may play a role and sleep studies(rule out sleep apnea) are recommended . Educate sleep hygiene. Consider anxiety disorder vs. depression. Consider Stress test, and 2DECHO.             Hypercholesterolemia E78.00       Hypercholesterolemia - Monitor lipid profile and educate patient upon risks of high cholesterol and targets. Educate diet and change in lifestyle and increase in exercises - Refill:   and educate compliance with medication and diet.              Other Visit Diagnoses           Codes     Mastoiditis of both sides     H70.93     Relevant Medications     doxycycline (Vibramycin) 100 mg capsule                            Acid reflux K21.9           GERD - Acid reflux disease. Rx. PPI (Prilosec/Prevacid/Protonix/Nexium) and educate diet and life style changes. Referred patient to an endoscopy (EGD) and check H. Pylori titers.            Asymptomatic cholelithiasis K80.20         Asymptomatic  now and we will follow closely and consider and advise cholecystectomy and educate risks of gall bladder disease and calculus            Cervical disc disease - Primary M50.90         DDD - Degenerative disc disease of the )/Cervical (C)  spine. Educate exercises and referred patient to Physical Therapy (PT). Ordered X-Ray's of the /C spine. Consider MRI. Radiculopathy in the distribution of C4-C5-C6 nerve roots, needs NSAIDS (Naprosin 500mg BID vs. Arthrotec 75mg BID or Prednisone taper (Medrol dose pack), Flexeril 10 mg qHS, heat application, and pain control with Tylenol 325 mg TID.             Lumbar disc disease with radiculopathy M51.16         DDD - Degenerative disc disease of the Lumbo-Sacral (LS)/Cervical (C)  spine. Educate exercises and referred patient to Physical Therapy (PT). Ordered X-Ray's of the LS/C spine. Consider MRI. Radiculopathy in the distribution of L4-L5-S1/C3-C4-C5 nerve roots, needs NSAIDS (Naprosin 500mg BID vs. Arthrotec 75mg BID or Prednisone taper (Medrol dose pack), Flexeril  10 mg qHS, heat application, and pain control with Tylenol 325 mg TID.               Muscle weakness (generalized) M62.81         Kne pains eand .reccomend exercises and strengthening exercises and .Xrays and refer to physical therapy and pain control             Nicotine dependence F17.200         Tobacco cessation - Educate risks of smoking (CAD/COPD/CANCER of the lung or urinary bladder/CVA). Educate life style changes and prescribe nicotine patch and Wellbutrin 150mg BID. Educate stress reduction.                                                                                        Obstructive sleep apnea G47.33         Sleep apnea/nocturnal hypoxia - Not/ using the CPAP consider repeat sleep studies and order a new CPAP - The patient is complaining of day-time sleepiness(falling asleep easily/  narcolepsy) while driving or sitting still. Also patient complains of major tiredness/ fatigue, multiple episodes of night time awakenings(unexpalined). Also patient is at high risk for sleep apnea: overweight, small throat opening and enlarged (kissing) tonsils, sedentary lifestyle, sleeping aides. Recommend: Sleep studies: Nocturnal oxymetry, sleep lab. Consider CPAP vs. Oxygen per Nasal Canula at night at 2L/min. for nocturnal hypoxia            Umbilical hernia without obstruction and without gangrene K42.9         Asymptomatic  now  - educate to avoid strain and recommend repair             Other Visit Diagnoses           Codes     Flu vaccine need     Z23     Relevant Orders     Flu vaccine, quadrivalent, high-dose, preservative free, age 65y+ (FLUZONE)                             Immunizations/Injections       very important  Immunizations from outside sources need reconciliation.       Flu vaccine, quadrivalent, high-dose, preservative free, age 65y+ (FLUZONE)12/1/2023  Pfizer COVID-19 vaccine, Fall 2023, 12 years and older, (30mcg/0.3mL)11/10/2023  Pfizer COVID-19 vaccine, bivalent, age 12 years and older (30  mcg/0.3 mL)10/26/2022  Pfizer Purple Cap SARS-CoV-21/2/2022, 4/4/2021, 3/13/2021                   Immunizations/Injections       very important  Immunizations from outside sources need reconciliation.       Flu vaccine, quadrivalent, high-dose, preservative free, age 65y+ (FLUZONE)12/1/2023  Pfizer COVID-19 vaccine, Fall 2023, 12 years and older, (30mcg/0.3mL)11/10/2023  Pfizer COVID-19 vaccine, bivalent, age 12 years and older (30 mcg/0.3 mL)10/26/2022  Pfizer Purple Cap SARS-CoV-21/2/2022, 4/4/2021, 3/13/2021  Pneumococcal conjugate vaccine, 20-valent (PREVNAR 20)5/5/2023                  Immunizations/Injections       very important  Immunizations from outside sources need reconciliation.       Flu vaccine, quadrivalent, high-dose, preservative free, age 65y+ (FLUZONE)12/1/2023  Pfizer COVID-19 vaccine, Fall 2023, 12 years and older, (30mcg/0.3mL)11/10/2023  Pfizer COVID-19 vaccine, bivalent, age 12 years and older (30 mcg/0.3 mL)10/26/2022  Pfizer Purple Cap SARS-CoV-21/2/2022, 4/4/2021, 3/13/2021  Pneumococcal conjugate vaccine, 20-valent (PREVNAR 20)5/5/2023

## 2025-02-24 ENCOUNTER — APPOINTMENT (OUTPATIENT)
Dept: PRIMARY CARE | Facility: CLINIC | Age: 70
End: 2025-02-24
Payer: MEDICARE

## 2025-03-12 ENCOUNTER — APPOINTMENT (OUTPATIENT)
Dept: PRIMARY CARE | Facility: CLINIC | Age: 70
End: 2025-03-12
Payer: MEDICARE

## 2025-03-12 VITALS
WEIGHT: 187 LBS | SYSTOLIC BLOOD PRESSURE: 132 MMHG | HEIGHT: 67 IN | HEART RATE: 90 BPM | DIASTOLIC BLOOD PRESSURE: 75 MMHG | BODY MASS INDEX: 29.35 KG/M2 | RESPIRATION RATE: 22 BRPM | OXYGEN SATURATION: 98 %

## 2025-03-12 DIAGNOSIS — M81.0 OSTEOPOROSIS: ICD-10-CM

## 2025-03-12 DIAGNOSIS — K80.20 ASYMPTOMATIC CHOLELITHIASIS: ICD-10-CM

## 2025-03-12 DIAGNOSIS — R73.9 HYPERGLYCEMIA: ICD-10-CM

## 2025-03-12 DIAGNOSIS — M51.16 LUMBAR DISC DISEASE WITH RADICULOPATHY: ICD-10-CM

## 2025-03-12 DIAGNOSIS — Z00.00 ROUTINE GENERAL MEDICAL EXAMINATION AT HEALTH CARE FACILITY: ICD-10-CM

## 2025-03-12 DIAGNOSIS — Z12.31 SCREENING MAMMOGRAM FOR BREAST CANCER: ICD-10-CM

## 2025-03-12 DIAGNOSIS — R53.83 OTHER FATIGUE: ICD-10-CM

## 2025-03-12 DIAGNOSIS — G47.33 OBSTRUCTIVE SLEEP APNEA: ICD-10-CM

## 2025-03-12 DIAGNOSIS — K21.9 GASTROESOPHAGEAL REFLUX DISEASE WITHOUT ESOPHAGITIS: ICD-10-CM

## 2025-03-12 DIAGNOSIS — Z00.00 MEDICARE ANNUAL WELLNESS VISIT, SUBSEQUENT: Primary | ICD-10-CM

## 2025-03-12 DIAGNOSIS — F17.210 CIGARETTE NICOTINE DEPENDENCE WITHOUT COMPLICATION: ICD-10-CM

## 2025-03-12 DIAGNOSIS — E78.00 HYPERCHOLESTEROLEMIA: ICD-10-CM

## 2025-03-12 PROCEDURE — 1170F FXNL STATUS ASSESSED: CPT | Performed by: INTERNAL MEDICINE

## 2025-03-12 PROCEDURE — 99214 OFFICE O/P EST MOD 30 MIN: CPT | Performed by: INTERNAL MEDICINE

## 2025-03-12 PROCEDURE — 1159F MED LIST DOCD IN RCRD: CPT | Performed by: INTERNAL MEDICINE

## 2025-03-12 PROCEDURE — 3008F BODY MASS INDEX DOCD: CPT | Performed by: INTERNAL MEDICINE

## 2025-03-12 PROCEDURE — G0439 PPPS, SUBSEQ VISIT: HCPCS | Performed by: INTERNAL MEDICINE

## 2025-03-12 PROCEDURE — 4004F PT TOBACCO SCREEN RCVD TLK: CPT | Performed by: INTERNAL MEDICINE

## 2025-03-12 PROCEDURE — 1160F RVW MEDS BY RX/DR IN RCRD: CPT | Performed by: INTERNAL MEDICINE

## 2025-03-12 ASSESSMENT — PATIENT HEALTH QUESTIONNAIRE - PHQ9
2. FEELING DOWN, DEPRESSED OR HOPELESS: NOT AT ALL
SUM OF ALL RESPONSES TO PHQ9 QUESTIONS 1 AND 2: 0
1. LITTLE INTEREST OR PLEASURE IN DOING THINGS: NOT AT ALL

## 2025-03-12 ASSESSMENT — ENCOUNTER SYMPTOMS
HEMATOLOGIC/LYMPHATIC NEGATIVE: 1
OCCASIONAL FEELINGS OF UNSTEADINESS: 0
CONSTITUTIONAL NEGATIVE: 1
ALLERGIC/IMMUNOLOGIC NEGATIVE: 1
CARDIOVASCULAR NEGATIVE: 1
MUSCULOSKELETAL NEGATIVE: 1
DEPRESSION: 0
EYES NEGATIVE: 1
NEUROLOGICAL NEGATIVE: 1
PSYCHIATRIC NEGATIVE: 1
ENDOCRINE NEGATIVE: 1
GASTROINTESTINAL NEGATIVE: 1
LOSS OF SENSATION IN FEET: 0
RESPIRATORY NEGATIVE: 1

## 2025-03-12 ASSESSMENT — ACTIVITIES OF DAILY LIVING (ADL)
BATHING: INDEPENDENT
MANAGING_FINANCES: INDEPENDENT
TAKING_MEDICATION: INDEPENDENT
GROCERY_SHOPPING: INDEPENDENT
DOING_HOUSEWORK: INDEPENDENT
DRESSING: INDEPENDENT

## 2025-03-12 NOTE — ASSESSMENT & PLAN NOTE
No recent hospitalizations.     All medications reviewed and reconciled by me the provider..  No use of controlled substances or opiates.     Family history, social history reviewed, no changes.     Patient does not smoke.     Patient does not drink.     Patient hydrates adequately daily.  Eats a well-balanced healthy diet.      Exercises adequately including walking and doing weightbearing exercises.     Patient denies any difficulty with memory or cognition.      Denies any difficulty with hearing.  Patient does not wear hearing aids.     No fall risk.  No recent falls.  Denies any difficulty walking.     Patient with no history of depression anxiety, denies any loss of interest, no feeling of sadness, no lack of motivation.     Patient is independent in all ADLs and IADLs.  Independent bathing, dressing, walking.  Takes care of own finances, shopping and cooking.      End-of-life decision-making power of  reviewed with patient.      Risk Factors Identified During Visit: None.   Influenza: influenza vaccine was previously given.   Pneumovax 23: Pneumovax 23 vaccine was previously given.   Prevnar 13: Prevnar 13 vaccine was previously given.   Shingles Vaccine: Shingles vaccine was previously given.   Colorectal Cancer Screening: screening is current.   Abdominal Aortic Aneurysm screening: screening is current.   HIV screening: screening not indicated.       Preventive measures - Recommend ASAP : PAP/Mamogram and refer patient to GYN. Specialist. Last Colonoscopy 2 years ago (educate patient risks of colon cancer) refer patient to GI specialist. Ophthalmology and retina exam recommend yearly exams refer patient to an Ophthalmologist.

## 2025-03-12 NOTE — ASSESSMENT & PLAN NOTE
Tobacco cessation - Educate risks of smoking (CAD/COPD/CANCER of the lung or urinary bladder/CVA). Educate life style changes and prescribe nicotine patch and Wellbutrin 150mg BID. Educate stress reduction.

## 2025-03-12 NOTE — ASSESSMENT & PLAN NOTE
Asymptomatic  now and we will follow closely and consider and advise cholecystectomy and educate risks of gall bladder disease and calculus     Orders:    Comprehensive Metabolic Panel; Future

## 2025-03-12 NOTE — PROGRESS NOTES
"Subjective   Reason for Visit: Hanna Gee is an 69 y.o. female here for a Medicare Wellness visit.          Reviewed all medications by prescribing practitioner or clinical pharmacist (such as prescriptions, OTCs, herbal therapies and supplements) and documented in the medical record.    HPI    Patient Care Team:  Leonid Berger MD as PCP - General (Internal Medicine)  Leonid Berger MD as PCP - [facility] Attributed Provider     Review of Systems   Constitutional: Negative.    HENT: Negative.     Eyes: Negative.    Respiratory: Negative.     Cardiovascular: Negative.    Gastrointestinal: Negative.    Endocrine: Negative.    Musculoskeletal: Negative.    Skin: Negative.    Allergic/Immunologic: Negative.    Neurological: Negative.    Hematological: Negative.    Psychiatric/Behavioral: Negative.     All other systems reviewed and are negative.      Objective   Vitals:  /75   Pulse 90   Resp 22   Ht 1.702 m (5' 7\")   Wt 84.8 kg (187 lb)   SpO2 98%   BMI 29.29 kg/m²       Physical Exam  Vitals and nursing note reviewed.   Constitutional:       Appearance: Normal appearance.   HENT:      Head: Normocephalic and atraumatic.      Right Ear: Tympanic membrane, ear canal and external ear normal.      Left Ear: Tympanic membrane, ear canal and external ear normal. There is no impacted cerumen.      Nose: Nose normal.      Mouth/Throat:      Mouth: Mucous membranes are moist.      Pharynx: Oropharynx is clear.   Eyes:      Extraocular Movements: Extraocular movements intact.      Conjunctiva/sclera: Conjunctivae normal.      Pupils: Pupils are equal, round, and reactive to light.   Cardiovascular:      Rate and Rhythm: Normal rate and regular rhythm.      Pulses: Normal pulses.      Heart sounds: Normal heart sounds. No murmur heard.  Pulmonary:      Effort: Pulmonary effort is normal. No respiratory distress.      Breath sounds: Normal breath sounds. No stridor. No wheezing, rhonchi or rales.   Chest:    "   Chest wall: No tenderness.   Abdominal:      General: Abdomen is flat. Bowel sounds are normal. There is no distension.      Palpations: Abdomen is soft. There is no mass.      Tenderness: There is no abdominal tenderness. There is no right CVA tenderness, left CVA tenderness, guarding or rebound.      Hernia: No hernia is present.   Musculoskeletal:         General: Normal range of motion.      Cervical back: Normal range of motion and neck supple.   Skin:     General: Skin is warm.      Capillary Refill: Capillary refill takes less than 2 seconds.   Neurological:      General: No focal deficit present.      Mental Status: She is alert.      Cranial Nerves: No cranial nerve deficit.      Sensory: No sensory deficit.      Motor: No weakness.      Coordination: Coordination normal.      Gait: Gait normal.      Deep Tendon Reflexes: Reflexes normal.   Psychiatric:         Mood and Affect: Mood normal.         Behavior: Behavior normal. Behavior is cooperative.         Thought Content: Thought content normal.         Judgment: Judgment normal.         Assessment & Plan  Routine general medical examination at health care facility    Orders:    1 Year Follow Up In Primary Care - Wellness Exam; Future    BI mammo bilateral screening tomosynthesis; Future    XR DEXA bone density; Future    Measles (Rubeola) Antibody, IgG; Future    Hypercholesterolemia  Hypercholesterolemia - Monitor lipid profile and educate patient upon risks of high cholesterol and targets. Educate diet and change in lifestyle and increase in exercises - Refill:   and educate compliance with medication and diet.      Orders:    Lipid Panel; Future    Gastroesophageal reflux disease without esophagitis  GERD - Acid reflux disease. Rx. PPI (Prilosec/Prevacid/Protonix/Nexium) and educate diet and life style changes. Referred patient to an endoscopy (EGD) and check H. Pylori titers.          Medicare annual wellness visit, subsequent  No recent  hospitalizations.     All medications reviewed and reconciled by me the provider..  No use of controlled substances or opiates.     Family history, social history reviewed, no changes.     Patient does not smoke.     Patient does not drink.     Patient hydrates adequately daily.  Eats a well-balanced healthy diet.      Exercises adequately including walking and doing weightbearing exercises.     Patient denies any difficulty with memory or cognition.      Denies any difficulty with hearing.  Patient does not wear hearing aids.     No fall risk.  No recent falls.  Denies any difficulty walking.     Patient with no history of depression anxiety, denies any loss of interest, no feeling of sadness, no lack of motivation.     Patient is independent in all ADLs and IADLs.  Independent bathing, dressing, walking.  Takes care of own finances, shopping and cooking.      End-of-life decision-making power of  reviewed with patient.      Risk Factors Identified During Visit: None.   Influenza: influenza vaccine was previously given.   Pneumovax 23: Pneumovax 23 vaccine was previously given.   Prevnar 13: Prevnar 13 vaccine was previously given.   Shingles Vaccine: Shingles vaccine was previously given.   Colorectal Cancer Screening: screening is current.   Abdominal Aortic Aneurysm screening: screening is current.   HIV screening: screening not indicated.       Preventive measures - Recommend ASAP : PAP/Mamogram and refer patient to GYN. Specialist. Last Colonoscopy 2 years ago (educate patient risks of colon cancer) refer patient to GI specialist. Ophthalmology and retina exam recommend yearly exams refer patient to an Ophthalmologist.          Lumbar disc disease with radiculopathy  DDD - Degenerative disc disease of the Lumbo-Sacral (LS)spine. Educate exercises and referred patient to Physical Therapy (PT). Ordered X-Ray's of the LS/spine. Consider MRI. Radiculopathy in the distribution of L4-L5-S1/nerve roots, needs  NSAIDS (Naprosin 500mg BID vs. Arthrotec 75mg BID or Prednisone taper (Medrol dose pack), Flexeril 10 mg qHS, heat application, and pain control with Tylenol v          Obstructive sleep apnea  Sleep apnea/nocturnal hypoxia - Not/ using the CPAP consider repeat sleep studies and order a new CPAP - The patient is complaining of day-time sleepiness(falling asleep easily/  narcolepsy) while driving or sitting still. Also patient complains of major tiredness/ fatigue, multiple episodes of night time awakenings(unexpalined). Also patient is at high risk for sleep apnea: overweight, small throat opening and enlarged (kissing) tonsils, sedentary lifestyle, sleeping aides. Recommend: Sleep studies: Nocturnal oxymetry, sleep lab. Consider CPAP vs. Oxygen per Nasal Canula at night at 2L/min. for nocturnal hypoxia             Cigarette nicotine dependence without complication  Tobacco cessation - Educate risks of smoking (CAD/COPD/CANCER of the lung or urinary bladder/CVA). Educate life style changes and prescribe nicotine patch and Wellbutrin 150mg BID. Educate stress reduction.                                                                                      Asymptomatic cholelithiasis  Asymptomatic  now and we will follow closely and consider and advise cholecystectomy and educate risks of gall bladder disease and calculus     Orders:    Comprehensive Metabolic Panel; Future    Other fatigue    Orders:    CBC and Auto Differential; Future    TSH with reflex to Free T4 if abnormal; Future    Hyperglycemia    Orders:    Hemoglobin A1C; Future    Screening mammogram for breast cancer    Orders:    BI mammo bilateral screening tomosynthesis; Future    Osteoporosis    Orders:    XR DEXA bone density; Future           Cardiac Risk Assessment  15 - 20 minutes were spent discussing Cardiovascular risk and, if needed, lifestyle modifications were recommended, including nutritional choices, exercise, and elimination of habits  contributing to risk.   Aspirin use/disuse was discussed following the guidelines below:  low dose ASA ( mg) should be considered:    If prior Heart Attack/Stroke/Peripheral vascular disease:  Generally recommend daily low dose aspirin unless extremely high bleeding risk (e.g., gastrointestinal).    If no prior Heart Attack/Stroke/Peripheral vascular disease:              Age over 70: Do not use Aspirin for prevention    Age less than 70 and 10-year cardiovascular disease risk is >20%: use low dose Aspirin for prevention.               Acid reflux K21.9        GERD - Acid reflux disease. Rx. PPI (Prilosec/Prevacid/Protonix/Nexium) and educate diet and life style changes. Referred patient to an endoscopy (EGD) and check H. Pylori titers.            Lumbar disc disease with radiculopathy - Primary M51.16       DDD - Degenerative disc disease of the Lumbo-Sacral (LS)spine. Educate exercises and referred patient to Physical Therapy (PT). Ordered X-Ray's of the LS/spine. Consider MRI. Radiculopathy in the distribution of L4-L5-S1/nerve roots, needs NSAIDS (Naprosin 500mg BID vs. Arthrotec 75mg BID or Prednisone taper (Medrol dose pack), Flexeril 10 mg qHS, heat application, and pain control with Tylenol v            Muscle weakness (generalized) M62.81       Knee pains eand .reccomend exercises and strengthening exercises and .Xrays and refer to physical therapy and pain control             Umbilical hernia without obstruction and without gangrene K42.9       Asymptomatic  now  - educate to avoid strain and recommend repair            Fatigue R53.83       Fatigue - check CMP(metabolic panel and elctrolytes) , CBC(complete blood cell count), TSH(thyroid function). Insomnia may play a role and sleep studies(rule out sleep apnea) are recommended . Educate sleep hygiene. Consider anxiety disorder vs. depression. Consider Stress test, and 2DECHO.             Hypercholesterolemia E78.00       Hypercholesterolemia -  Monitor lipid profile and educate patient upon risks of high cholesterol and targets. Educate diet and change in lifestyle and increase in exercises - Refill: and educate compliance with medication and diet.                      Acid reflux K21.9         GERD - Acid reflux disease. Rx. PPI (Prilosec/Prevacid/Protonix/Nexium) and educate diet and life style changes. Referred patient to an endoscopy (EGD) and check H. Pylori titers.            Cervical disc disease M50.90       DDD - Degenerative disc disease of the )/Cervical (C) spine. Educate exercises and referred patient to Physical Therapy (PT). Ordered X-Ray's of the /C spine. Consider MRI. Radiculopathy in the distribution of C4-C5-C6 nerve roots, needs NSAIDS (Naprosin 500mg BID vs. Arthrotec 75mg BID or Prednisone taper (Medrol dose pack), Flexeril 10 mg qHS, heat application, and pain control with Tylenol 325 mg TID.            Lumbar disc disease with radiculopathy - Primary M51.16       DDD - Degenerative disc disease of the Lumbo-Sacral (LS)/Cervical (C) spine. Educate exercises and referred patient to Physical Therapy (PT). Ordered X-Ray's of the LS/C spine. Consider MRI. Radiculopathy in the distribution of L4-L5-S1/C3-C4-C5 nerve roots, needs NSAIDS (Naprosin 500mg BID vs. Arthrotec 75mg BID or Prednisone taper (Medrol dose pack), Flexeril 10 mg qHS, heat application, and pain control with Tylenol vs. Vicodin 5/325 mg TID.            Fatigue R53.83       Fatigue - check CMP(metabolic panel and elctrolytes) , CBC(complete blood cell count), TSH(thyroid function). Insomnia may play a role and sleep studies(rule out sleep apnea) are recommended . Educate sleep hygiene. Consider anxiety disorder vs. depression. Consider Stress test, and 2DECHO.             Hypercholesterolemia E78.00       Hypercholesterolemia - Monitor lipid profile and educate patient upon risks of high cholesterol and targets. Educate diet and change in lifestyle and increase in  exercises - Refill:   and educate compliance with medication and diet.             Aching headache R51.9       Occipital head ache and improved now but still mild tension observe for now and no associated fever             Other Visit Diagnoses           Codes     Tobacco use     Z72.0     Relevant Orders     CT lung screening low dose     Tobacco dependence due to cigarettes, in remission     F17.211     Relevant Orders     CT lung screening low dose                          Acid reflux - Primary K21.9         GERD - Acid reflux disease. Rx. PPI (Prilosec/Prevacid/Protonix/Nexium) and educate diet and life style changes. Referred patient to an endoscopy (EGD) and check H. Pylori titers.            Relevant Medications     pantoprazole (ProtoNix) 40 mg EC tablet     Lumbar disc disease with radiculopathy M51.16       DDD - Degenerative disc disease of the Lumbo-Sacral (LS)/Cervical (C)  spine. Educate exercises and referred patient to Physical Therapy (PT). Ordered X-Ray's of the LS/C spine. Consider MRI. Radiculopathy in the distribution of L4-L5-S1/C3-C4-C5 nerve roots, needs NSAIDS (Naprosin 500mg BID vs. Arthrotec 75mg BID or Prednisone taper (Medrol dose pack), Flexeril 10 mg qHS, heat application, and pain control with Tylenol vs. Vicodin 5/325 mg TID.             Tinnitus of both ears H93.13       Refer to ENT and start low dose Spironolactone 25 mg daily and monitor and start Flonase I BID and Allegra 180 mg daily Reviewed with the patient the  the MRI            Fatigue R53.83       Fatigue - check CMP(metabolic panel and elctrolytes) , CBC(complete blood cell count), TSH(thyroid function). Insomnia may play a role and sleep studies(rule out sleep apnea) are recommended . Educate sleep hygiene. Consider anxiety disorder vs. depression. Consider Stress test, and 2DECHO.             Hypercholesterolemia E78.00       Hypercholesterolemia - Monitor lipid profile and educate patient upon risks of high  cholesterol and targets. Educate diet and change in lifestyle and increase in exercises - Refill:   and educate compliance with medication and diet.              Other Visit Diagnoses           Codes     Mastoiditis of both sides     H70.93     Relevant Medications     doxycycline (Vibramycin) 100 mg capsule                            Acid reflux K21.9           GERD - Acid reflux disease. Rx. PPI (Prilosec/Prevacid/Protonix/Nexium) and educate diet and life style changes. Referred patient to an endoscopy (EGD) and check H. Pylori titers.            Asymptomatic cholelithiasis K80.20         Asymptomatic  now and we will follow closely and consider and advise cholecystectomy and educate risks of gall bladder disease and calculus            Cervical disc disease - Primary M50.90         DDD - Degenerative disc disease of the )/Cervical (C)  spine. Educate exercises and referred patient to Physical Therapy (PT). Ordered X-Ray's of the /C spine. Consider MRI. Radiculopathy in the distribution of C4-C5-C6 nerve roots, needs NSAIDS (Naprosin 500mg BID vs. Arthrotec 75mg BID or Prednisone taper (Medrol dose pack), Flexeril 10 mg qHS, heat application, and pain control with Tylenol 325 mg TID.             Lumbar disc disease with radiculopathy M51.16         DDD - Degenerative disc disease of the Lumbo-Sacral (LS)/Cervical (C)  spine. Educate exercises and referred patient to Physical Therapy (PT). Ordered X-Ray's of the LS/C spine. Consider MRI. Radiculopathy in the distribution of L4-L5-S1/C3-C4-C5 nerve roots, needs NSAIDS (Naprosin 500mg BID vs. Arthrotec 75mg BID or Prednisone taper (Medrol dose pack), Flexeril 10 mg qHS, heat application, and pain control with Tylenol 325 mg TID.               Muscle weakness (generalized) M62.81         Kne pains eand .reccomend exercises and strengthening exercises and .Xrays and refer to physical therapy and pain control             Nicotine dependence F17.200         Tobacco  cessation - Educate risks of smoking (CAD/COPD/CANCER of the lung or urinary bladder/CVA). Educate life style changes and prescribe nicotine patch and Wellbutrin 150mg BID. Educate stress reduction.                                                                                        Obstructive sleep apnea G47.33         Sleep apnea/nocturnal hypoxia - Not/ using the CPAP consider repeat sleep studies and order a new CPAP - The patient is complaining of day-time sleepiness(falling asleep easily/  narcolepsy) while driving or sitting still. Also patient complains of major tiredness/ fatigue, multiple episodes of night time awakenings(unexpalined). Also patient is at high risk for sleep apnea: overweight, small throat opening and enlarged (kissing) tonsils, sedentary lifestyle, sleeping aides. Recommend: Sleep studies: Nocturnal oxymetry, sleep lab. Consider CPAP vs. Oxygen per Nasal Canula at night at 2L/min. for nocturnal hypoxia            Umbilical hernia without obstruction and without gangrene K42.9         Asymptomatic  now  - educate to avoid strain and recommend repair             Other Visit Diagnoses           Codes     Flu vaccine need     Z23     Relevant Orders     Flu vaccine, quadrivalent, high-dose, preservative free, age 65y+ (FLUZONE)                             Immunizations/Injections       very important  Immunizations from outside sources need reconciliation.       Flu vaccine, quadrivalent, high-dose, preservative free, age 65y+ (FLUZONE)12/1/2023  Pfizer COVID-19 vaccine, Fall 2023, 12 years and older, (30mcg/0.3mL)11/10/2023  Pfizer COVID-19 vaccine, bivalent, age 12 years and older (30 mcg/0.3 mL)10/26/2022  Pfizer Purple Cap SARS-CoV-21/2/2022, 4/4/2021, 3/13/2021                   Immunizations/Injections       very important  Immunizations from outside sources need reconciliation.       Flu vaccine, quadrivalent, high-dose, preservative free, age 65y+ (FLUZONE)12/1/2023  Pfizer COVID-19  vaccine, Fall 2023, 12 years and older, (30mcg/0.3mL)11/10/2023  Pfizer COVID-19 vaccine, bivalent, age 12 years and older (30 mcg/0.3 mL)10/26/2022  Pfizer Purple Cap SARS-CoV-21/2/2022, 4/4/2021, 3/13/2021  Pneumococcal conjugate vaccine, 20-valent (PREVNAR 20)5/5/2023                  Immunizations/Injections       very important  Immunizations from outside sources need reconciliation.       Flu vaccine, quadrivalent, high-dose, preservative free, age 65y+ (FLUZONE)12/1/2023  Pfizer COVID-19 vaccine, Fall 2023, 12 years and older, (30mcg/0.3mL)11/10/2023  Pfizer COVID-19 vaccine, bivalent, age 12 years and older (30 mcg/0.3 mL)10/26/2022  Pfizer Purple Cap SARS-CoV-21/2/2022, 4/4/2021, 3/13/2021  Pneumococcal conjugate vaccine, 20-valent (PREVNAR 20)5/5/2023                         Immunizations/Injections      very important  Immunizations from outside sources need reconciliation.      COVID-19, mRNA, LNP-S, PF, 30 mcg/0.3 mL dose1/2/2022, 4/4/2021, 3/13/2021  Flu vaccine, quadrivalent, high-dose, preservative free, age 65y+ (FLUZONE)12/1/2023  Moderna COVID-19 vaccine, 12 years and older (50mcg/0.5mL)(Spikevax)11/5/2024  Pfizer COVID-19 vaccine, 12 years and older, (30mcg/0.3mL) (Comirnaty)11/10/2023  Pfizer COVID-19 vaccine, bivalent, age 12 years and older (30 mcg/0.3 mL)10/26/2022  Pneumococcal conjugate vaccine, 20-valent (PREVNAR 20)5/5/2023

## 2025-03-12 NOTE — ASSESSMENT & PLAN NOTE
Hypercholesterolemia - Monitor lipid profile and educate patient upon risks of high cholesterol and targets. Educate diet and change in lifestyle and increase in exercises - Refill:   and educate compliance with medication and diet.      Orders:    Lipid Panel; Future

## 2025-03-13 LAB
ALBUMIN SERPL-MCNC: 4.6 G/DL (ref 3.6–5.1)
ALP SERPL-CCNC: 61 U/L (ref 37–153)
ALT SERPL-CCNC: 15 U/L (ref 6–29)
ANION GAP SERPL CALCULATED.4IONS-SCNC: 8 MMOL/L (CALC) (ref 7–17)
AST SERPL-CCNC: 16 U/L (ref 10–35)
BASOPHILS # BLD AUTO: 29 CELLS/UL (ref 0–200)
BASOPHILS NFR BLD AUTO: 0.4 %
BILIRUB SERPL-MCNC: 0.7 MG/DL (ref 0.2–1.2)
BUN SERPL-MCNC: 14 MG/DL (ref 7–25)
CALCIUM SERPL-MCNC: 9.3 MG/DL (ref 8.6–10.4)
CHLORIDE SERPL-SCNC: 104 MMOL/L (ref 98–110)
CHOLEST SERPL-MCNC: 206 MG/DL
CHOLEST/HDLC SERPL: 2.4 (CALC)
CO2 SERPL-SCNC: 29 MMOL/L (ref 20–32)
CREAT SERPL-MCNC: 0.75 MG/DL (ref 0.5–1.05)
EGFRCR SERPLBLD CKD-EPI 2021: 87 ML/MIN/1.73M2
EOSINOPHIL # BLD AUTO: 50 CELLS/UL (ref 15–500)
EOSINOPHIL NFR BLD AUTO: 0.7 %
ERYTHROCYTE [DISTWIDTH] IN BLOOD BY AUTOMATED COUNT: 14 % (ref 11–15)
EST. AVERAGE GLUCOSE BLD GHB EST-MCNC: 111 MG/DL
EST. AVERAGE GLUCOSE BLD GHB EST-SCNC: 6.2 MMOL/L
GLUCOSE SERPL-MCNC: 109 MG/DL (ref 65–99)
HBA1C MFR BLD: 5.5 % OF TOTAL HGB
HCT VFR BLD AUTO: 45 % (ref 35–45)
HDLC SERPL-MCNC: 87 MG/DL
HGB BLD-MCNC: 14.2 G/DL (ref 11.7–15.5)
LDLC SERPL CALC-MCNC: 100 MG/DL (CALC)
LYMPHOCYTES # BLD AUTO: 1606 CELLS/UL (ref 850–3900)
LYMPHOCYTES NFR BLD AUTO: 22.3 %
MCH RBC QN AUTO: 27.2 PG (ref 27–33)
MCHC RBC AUTO-ENTMCNC: 31.6 G/DL (ref 32–36)
MCV RBC AUTO: 86.2 FL (ref 80–100)
MEV IGG SER IA-ACNC: 211 AU/ML
MONOCYTES # BLD AUTO: 382 CELLS/UL (ref 200–950)
MONOCYTES NFR BLD AUTO: 5.3 %
NEUTROPHILS # BLD AUTO: 5134 CELLS/UL (ref 1500–7800)
NEUTROPHILS NFR BLD AUTO: 71.3 %
NONHDLC SERPL-MCNC: 119 MG/DL (CALC)
PLATELET # BLD AUTO: 234 THOUSAND/UL (ref 140–400)
PMV BLD REES-ECKER: 11.4 FL (ref 7.5–12.5)
POTASSIUM SERPL-SCNC: 4.3 MMOL/L (ref 3.5–5.3)
PROT SERPL-MCNC: 7 G/DL (ref 6.1–8.1)
RBC # BLD AUTO: 5.22 MILLION/UL (ref 3.8–5.1)
SODIUM SERPL-SCNC: 141 MMOL/L (ref 135–146)
TRIGL SERPL-MCNC: 94 MG/DL
TSH SERPL-ACNC: 1.35 MIU/L (ref 0.4–4.5)
WBC # BLD AUTO: 7.2 THOUSAND/UL (ref 3.8–10.8)

## 2025-04-03 ENCOUNTER — APPOINTMENT (OUTPATIENT)
Dept: RADIOLOGY | Facility: CLINIC | Age: 70
End: 2025-04-03
Payer: MEDICARE

## 2025-04-23 ENCOUNTER — APPOINTMENT (OUTPATIENT)
Dept: RADIOLOGY | Facility: CLINIC | Age: 70
End: 2025-04-23
Payer: MEDICARE

## 2025-05-15 ENCOUNTER — APPOINTMENT (OUTPATIENT)
Dept: RADIOLOGY | Facility: CLINIC | Age: 70
End: 2025-05-15
Payer: MEDICARE

## 2025-05-15 DIAGNOSIS — M81.0 OSTEOPOROSIS: ICD-10-CM

## 2025-05-15 DIAGNOSIS — Z00.00 ROUTINE GENERAL MEDICAL EXAMINATION AT HEALTH CARE FACILITY: ICD-10-CM

## 2025-05-15 PROCEDURE — 77080 DXA BONE DENSITY AXIAL: CPT

## 2025-05-19 ENCOUNTER — TELEPHONE (OUTPATIENT)
Dept: PRIMARY CARE | Facility: CLINIC | Age: 70
End: 2025-05-19
Payer: MEDICARE

## 2025-06-13 ENCOUNTER — APPOINTMENT (OUTPATIENT)
Dept: PRIMARY CARE | Facility: CLINIC | Age: 70
End: 2025-06-13
Payer: MEDICARE

## 2025-08-11 DIAGNOSIS — F17.210 TOBACCO DEPENDENCE DUE TO CIGARETTES: Primary | ICD-10-CM

## 2025-08-16 ENCOUNTER — OFFICE VISIT (OUTPATIENT)
Dept: URGENT CARE | Age: 70
End: 2025-08-16
Payer: MEDICARE

## 2025-08-16 VITALS
HEART RATE: 105 BPM | BODY MASS INDEX: 29.35 KG/M2 | WEIGHT: 187 LBS | HEIGHT: 67 IN | TEMPERATURE: 99.1 F | DIASTOLIC BLOOD PRESSURE: 88 MMHG | SYSTOLIC BLOOD PRESSURE: 137 MMHG | OXYGEN SATURATION: 96 % | RESPIRATION RATE: 18 BRPM

## 2025-08-16 DIAGNOSIS — B37.2 CANDIDAL INTERTRIGO: Primary | ICD-10-CM

## 2025-08-16 RX ORDER — NYSTATIN 100000 U/G
OINTMENT TOPICAL 2 TIMES DAILY
Qty: 30 G | Refills: 0 | Status: SHIPPED | OUTPATIENT
Start: 2025-08-16

## 2025-08-21 ENCOUNTER — OFFICE VISIT (OUTPATIENT)
Dept: PRIMARY CARE | Facility: CLINIC | Age: 70
End: 2025-08-21
Payer: MEDICARE

## 2025-08-21 VITALS
OXYGEN SATURATION: 98 % | BODY MASS INDEX: 29.35 KG/M2 | SYSTOLIC BLOOD PRESSURE: 128 MMHG | HEART RATE: 108 BPM | DIASTOLIC BLOOD PRESSURE: 86 MMHG | HEIGHT: 67 IN | WEIGHT: 187 LBS | RESPIRATION RATE: 21 BRPM

## 2025-08-21 DIAGNOSIS — R21 RASH: Primary | ICD-10-CM

## 2025-08-21 PROCEDURE — 1159F MED LIST DOCD IN RCRD: CPT

## 2025-08-21 PROCEDURE — 99213 OFFICE O/P EST LOW 20 MIN: CPT

## 2025-08-21 PROCEDURE — 3008F BODY MASS INDEX DOCD: CPT

## 2025-08-21 RX ORDER — GABAPENTIN 100 MG/1
100 CAPSULE ORAL 3 TIMES DAILY
Qty: 60 CAPSULE | Refills: 0 | Status: SHIPPED | OUTPATIENT
Start: 2025-08-21 | End: 2025-09-11

## 2025-08-21 RX ORDER — VALACYCLOVIR HYDROCHLORIDE 1 G/1
1000 TABLET, FILM COATED ORAL 3 TIMES DAILY
Qty: 21 TABLET | Refills: 0 | Status: SHIPPED | OUTPATIENT
Start: 2025-08-21 | End: 2025-08-28

## 2025-08-27 ENCOUNTER — APPOINTMENT (OUTPATIENT)
Dept: PRIMARY CARE | Facility: CLINIC | Age: 70
End: 2025-08-27
Payer: MEDICARE

## 2025-09-10 ENCOUNTER — APPOINTMENT (OUTPATIENT)
Dept: RADIOLOGY | Facility: CLINIC | Age: 70
End: 2025-09-10
Payer: MEDICARE

## 2025-09-25 ENCOUNTER — APPOINTMENT (OUTPATIENT)
Dept: PRIMARY CARE | Facility: CLINIC | Age: 70
End: 2025-09-25
Payer: MEDICARE

## 2025-10-28 ENCOUNTER — APPOINTMENT (OUTPATIENT)
Dept: OTOLARYNGOLOGY | Facility: CLINIC | Age: 70
End: 2025-10-28
Payer: MEDICARE

## 2025-10-30 ENCOUNTER — APPOINTMENT (OUTPATIENT)
Dept: AUDIOLOGY | Facility: CLINIC | Age: 70
End: 2025-10-30
Payer: MEDICARE

## 2025-10-30 ENCOUNTER — APPOINTMENT (OUTPATIENT)
Dept: OTOLARYNGOLOGY | Facility: CLINIC | Age: 70
End: 2025-10-30
Payer: MEDICARE

## (undated) DEVICE — SUTURE, PLAIN, 5-0, 18 IN, PC1, YELLOW

## (undated) DEVICE — DRAPE, TIBURON, SPLIT SHEET, REINF ADH STRIP, 77X122

## (undated) DEVICE — SUTURE, CHROMIC 4-0 RB1

## (undated) DEVICE — GLOVE, SURGICAL, PROTEXIS PI BLUE W/NEUTHERA, 7.5, PF, LF

## (undated) DEVICE — SOLUTION, IRRIGATION, SODIUM CHLORIDE 0.9%, 1000 ML, POUR BOTTLE

## (undated) DEVICE — SYRINGE, HYPODERMIC, CONTROL, LUER LOCK, 10 CC, PLASTIC, STERILE

## (undated) DEVICE — CORD, CAUTERY, BIOPOLAR FORCEP, 12FT

## (undated) DEVICE — DRAPE PACK, MINOR, CUSTOM, GEAUGA

## (undated) DEVICE — DEPRESSOR, TONGUE, 6 IN, WOOD, STERILE, LF

## (undated) DEVICE — CONTAINER, SPECIMEN, 120ML